# Patient Record
Sex: MALE | Race: WHITE | NOT HISPANIC OR LATINO | URBAN - METROPOLITAN AREA
[De-identification: names, ages, dates, MRNs, and addresses within clinical notes are randomized per-mention and may not be internally consistent; named-entity substitution may affect disease eponyms.]

---

## 2024-10-21 ENCOUNTER — HOSPITAL ENCOUNTER (EMERGENCY)
Facility: HOSPITAL | Age: 29
Discharge: HOME | End: 2024-10-21
Attending: EMERGENCY MEDICINE

## 2024-10-21 VITALS
HEIGHT: 76 IN | TEMPERATURE: 98 F | DIASTOLIC BLOOD PRESSURE: 94 MMHG | SYSTOLIC BLOOD PRESSURE: 170 MMHG | OXYGEN SATURATION: 97 % | RESPIRATION RATE: 16 BRPM | BODY MASS INDEX: 38.36 KG/M2 | HEART RATE: 97 BPM | WEIGHT: 315 LBS

## 2024-10-21 DIAGNOSIS — M54.16 LUMBAR RADICULOPATHY: Primary | ICD-10-CM

## 2024-10-21 PROCEDURE — 99284 EMERGENCY DEPT VISIT MOD MDM: CPT | Mod: 25,ER

## 2024-10-21 PROCEDURE — 25000003 PHARM REV CODE 250: Mod: ER | Performed by: EMERGENCY MEDICINE

## 2024-10-21 RX ORDER — HYDROCODONE BITARTRATE AND ACETAMINOPHEN 5; 325 MG/1; MG/1
1 TABLET ORAL
Status: COMPLETED | OUTPATIENT
Start: 2024-10-21 | End: 2024-10-21

## 2024-10-21 RX ORDER — METHYLPREDNISOLONE 4 MG/1
TABLET ORAL
Qty: 1 EACH | Refills: 0 | Status: SHIPPED | OUTPATIENT
Start: 2024-10-21

## 2024-10-21 RX ORDER — METHOCARBAMOL 500 MG/1
1000 TABLET, FILM COATED ORAL 3 TIMES DAILY
Qty: 30 TABLET | Refills: 0 | Status: SHIPPED | OUTPATIENT
Start: 2024-10-21 | End: 2024-10-26

## 2024-10-21 RX ADMIN — HYDROCODONE BITARTRATE AND ACETAMINOPHEN 1 TABLET: 5; 325 TABLET ORAL at 03:10

## 2024-10-24 ENCOUNTER — HOSPITAL ENCOUNTER (EMERGENCY)
Facility: HOSPITAL | Age: 29
Discharge: HOME | End: 2024-10-25
Attending: EMERGENCY MEDICINE

## 2024-10-24 DIAGNOSIS — M51.26 LUMBAR HERNIATED DISC: Primary | ICD-10-CM

## 2024-10-24 LAB
ALBUMIN SERPL BCP-MCNC: 4.3 G/DL (ref 3.5–5.2)
ALP SERPL-CCNC: 74 U/L (ref 40–150)
ALT SERPL W/O P-5'-P-CCNC: 42 U/L (ref 10–44)
ANION GAP SERPL CALC-SCNC: 9 MMOL/L (ref 8–16)
AST SERPL-CCNC: 28 U/L (ref 10–40)
BASOPHILS # BLD AUTO: 0.06 K/UL (ref 0–0.2)
BASOPHILS NFR BLD: 0.3 % (ref 0–1.9)
BILIRUB SERPL-MCNC: 0.4 MG/DL (ref 0.1–1)
BUN SERPL-MCNC: 16 MG/DL (ref 6–20)
CALCIUM SERPL-MCNC: 9.4 MG/DL (ref 8.7–10.5)
CHLORIDE SERPL-SCNC: 105 MMOL/L (ref 95–110)
CO2 SERPL-SCNC: 24 MMOL/L (ref 23–29)
CREAT SERPL-MCNC: 0.8 MG/DL (ref 0.5–1.4)
DIFFERENTIAL METHOD BLD: ABNORMAL
EOSINOPHIL # BLD AUTO: 0.2 K/UL (ref 0–0.5)
EOSINOPHIL NFR BLD: 1 % (ref 0–8)
ERYTHROCYTE [DISTWIDTH] IN BLOOD BY AUTOMATED COUNT: 12.6 % (ref 11.5–14.5)
EST. GFR  (NO RACE VARIABLE): >60 ML/MIN/1.73 M^2
GLUCOSE SERPL-MCNC: 102 MG/DL (ref 70–110)
HCT VFR BLD AUTO: 49.9 % (ref 40–54)
HGB BLD-MCNC: 16.7 G/DL (ref 14–18)
IMM GRANULOCYTES # BLD AUTO: 0.14 K/UL (ref 0–0.04)
IMM GRANULOCYTES NFR BLD AUTO: 0.8 % (ref 0–0.5)
LYMPHOCYTES # BLD AUTO: 2.6 K/UL (ref 1–4.8)
LYMPHOCYTES NFR BLD: 14.5 % (ref 18–48)
MCH RBC QN AUTO: 30 PG (ref 27–31)
MCHC RBC AUTO-ENTMCNC: 33.5 G/DL (ref 32–36)
MCV RBC AUTO: 90 FL (ref 82–98)
MONOCYTES # BLD AUTO: 1 K/UL (ref 0.3–1)
MONOCYTES NFR BLD: 5.6 % (ref 4–15)
NEUTROPHILS # BLD AUTO: 14 K/UL (ref 1.8–7.7)
NEUTROPHILS NFR BLD: 77.8 % (ref 38–73)
NRBC BLD-RTO: 0 /100 WBC
PLATELET # BLD AUTO: 264 K/UL (ref 150–450)
PMV BLD AUTO: 11 FL (ref 9.2–12.9)
POTASSIUM SERPL-SCNC: 4.3 MMOL/L (ref 3.5–5.1)
PROT SERPL-MCNC: 7.3 G/DL (ref 6–8.4)
RBC # BLD AUTO: 5.56 M/UL (ref 4.6–6.2)
SODIUM SERPL-SCNC: 138 MMOL/L (ref 136–145)
WBC # BLD AUTO: 17.96 K/UL (ref 3.9–12.7)

## 2024-10-24 PROCEDURE — 85025 COMPLETE CBC W/AUTO DIFF WBC: CPT

## 2024-10-24 PROCEDURE — A9585 GADOBUTROL INJECTION: HCPCS | Performed by: EMERGENCY MEDICINE

## 2024-10-24 PROCEDURE — 99284 EMERGENCY DEPT VISIT MOD MDM: CPT | Mod: 25

## 2024-10-24 PROCEDURE — 25500020 PHARM REV CODE 255: Performed by: EMERGENCY MEDICINE

## 2024-10-24 PROCEDURE — 80053 COMPREHEN METABOLIC PANEL: CPT

## 2024-10-24 RX ORDER — GADOBUTROL 604.72 MG/ML
10 INJECTION INTRAVENOUS
Status: COMPLETED | OUTPATIENT
Start: 2024-10-24 | End: 2024-10-24

## 2024-10-24 RX ADMIN — GADOBUTROL 10 ML: 604.72 INJECTION INTRAVENOUS at 05:10

## 2024-10-25 VITALS
HEIGHT: 76 IN | WEIGHT: 315 LBS | SYSTOLIC BLOOD PRESSURE: 156 MMHG | HEART RATE: 63 BPM | DIASTOLIC BLOOD PRESSURE: 73 MMHG | RESPIRATION RATE: 17 BRPM | TEMPERATURE: 98 F | OXYGEN SATURATION: 97 % | BODY MASS INDEX: 38.36 KG/M2

## 2024-10-25 PROBLEM — M51.26 LUMBAR DISC HERNIATION: Status: ACTIVE | Noted: 2024-10-25

## 2024-10-25 PROCEDURE — 99284 EMERGENCY DEPT VISIT MOD MDM: CPT | Mod: ,,, | Performed by: NEUROLOGICAL SURGERY

## 2024-11-04 ENCOUNTER — CLINICAL SUPPORT (OUTPATIENT)
Dept: REHABILITATION | Facility: HOSPITAL | Age: 29
End: 2024-11-04
Attending: EMERGENCY MEDICINE

## 2024-11-04 DIAGNOSIS — M51.26 LUMBAR HERNIATED DISC: ICD-10-CM

## 2024-11-04 PROCEDURE — 97530 THERAPEUTIC ACTIVITIES: CPT | Mod: PO

## 2024-11-04 PROCEDURE — 97110 THERAPEUTIC EXERCISES: CPT | Mod: PO

## 2024-11-04 PROCEDURE — 97140 MANUAL THERAPY 1/> REGIONS: CPT | Mod: PO

## 2024-11-04 PROCEDURE — 97162 PT EVAL MOD COMPLEX 30 MIN: CPT | Mod: PO

## 2024-11-04 PROCEDURE — 97112 NEUROMUSCULAR REEDUCATION: CPT | Mod: PO

## 2024-11-04 NOTE — PLAN OF CARE
"[facility]   Physical Therapy Initial Evaluation      Name: Pankaj Bustamante  Clinic Number: [id number]    Therapy Diagnosis:   Encounter Diagnosis   Name Primary?    Lumbar herniated disc         Physician: Lita Nina MD    Physician Orders: PT Eval and Treat ***  Medical Diagnosis from Referral: Lumbar herniated disc  Evaluation Date: 11/4/2024  Authorization Period Expiration: ***  Plan of Care Expiration: ***  Progress Note Due: ***  Date of Surgery: ***  Visit # / Visits authorized: ***/ ***   FOTO: ***/ 3    Precautions: {IP WOUND PRECAUTIONS OHS:22932}     Time In: ***  Time Out: ***  Total Billable Time: *** minutes    Subjective     Date of onset: ***    History of current condition - Denominational reports: ***    Falls: ***    Imaging: {Mri/ctscan/bone scan:44313}: ***    Prior Therapy: ***  Social History: *** {LIVES WITH:44613}  Occupation: ***  Prior Level of Function: ***  Current Level of Function: ***    Pain:  Current {0-10:20507::"0"}/10, worst {0-10:20507::"0"}/10, best {0-10:20507::"0"}/10   Location: {RIGHT LEFT BILATERAL:48969} {LOCATION ON BODY:72718} {Pain Loc:67241}  Description: {Pain Description:02076}  Aggravating Factors: {Causes; Pain:95818}  Easing Factors: {Pain (activities that relieve):35209}    Patients goals: ***     Medical History:   No past medical history on file.    Surgical History:   Pankaj Bustamante  has no past surgical history on file.    Medications:   Denominational has a current medication list which includes the following prescription(s): methylprednisolone.    Allergies:   Review of patient's allergies indicates:  No Known Allergies     Objective      ***    Intake Outcome Measure for FOTO *** Survey    Therapist reviewed FOTO scores for Pankaj Bustamante on 11/4/2024.   FOTO report - see Media section or FOTO account episode details.    Intake Score: ***%         Treatment     Total Treatment time (time-based codes) separate " from Evaluation: *** minutes     Pankaj received the treatments listed below:      therapeutic exercises to develop {AMB PT PROGRESS OBJECTIVE:00256} for *** minutes including:  ***    manual therapy techniques: {AMB PT PROGRESS MANUAL THERAPY:89765} were applied to the: *** for *** minutes, including:  ***    neuromuscular re-education activities to improve: {AMB PT PROGRESS NEURO RE-ED:07940} for *** minutes. The following activities were included:  ***    therapeutic activities to improve functional performance for ***  minutes, including:  ***    gait training to improve functional mobility and safety for ***  minutes, including:  ***    direct contact modalities after being cleared for contraindications: {AMB PT PROGRESS DIRECT CONTACT MODES:67505}    supervised modalities after being cleared for contradictions: {AMB PT SUPERVISED MODES:25906}    hot pack for *** minutes to ***.    cold pack for *** minutes to ***.    Patient Education and Home Exercises     Education provided:   - ***    Written Home Exercises Provided: {Home Exercise Program:01288}. Exercises were reviewed and Pankaj was able to demonstrate them prior to the end of the session.  Pankaj demonstrated {Desc; good/fair/poor:69540} understanding of the education provided. See EMR under Patient Instructions for exercises provided during therapy sessions.    Assessment     Pankaj is a 29 y.o. male referred to outpatient Physical Therapy with a medical diagnosis of ***. Patient presents with ***    Patient prognosis is {REHAB PROGNOSIS OHS:08452}.   Patient will benefit from skilled outpatient Physical Therapy to address the deficits stated above and in the chart below, provide patient /family education, and to maximize patientt's level of independence.     Plan of care discussed with patient: {YES:16220}  Patient's spiritual, cultural and educational needs considered and patient is agreeable to the plan of care and goals as stated below:  "    Anticipated Barriers for therapy: ***    Medical Necessity is demonstrated by the following  History  Co-morbidities and personal factors that may impact the plan of care [] LOW: no personal factors / co-morbidities  [] MODERATE: 1-2 personal factors / co-morbidities  [] HIGH: 3+ personal factors / co-morbidities    Moderate / High Support Documentation:   Co-morbidities affecting plan of care: ***    Personal Factors:   {Personal Factors:18137}     Examination  Body Structures and Functions, activity limitations and participation restrictions that may impact the plan of care [] LOW: addressing 1-2 elements  [] MODERATE: 3+ elements  [] HIGH: 4+ elements (please support below)    Moderate / High Support Documentation: ***     Clinical Presentation [] LOW: stable  [] MODERATE: Evolving  [] HIGH: Unstable     Decision Making/ Complexity Score: {Desc; low/moderate/high:146377}       Goals:  Short Term Goals: *** weeks   ***    Long Term Goals: *** weeks   ***  Plan     Plan of care Certification: 11/4/2024 to ***.    Outpatient Physical Therapy {NUMBERS 1-5:53603} times weekly for {0-10:40866::"0"} weeks to include the following interventions: {TX PLAN:36987}.     Yanna Morales PT        Physician's Signature: _________________________________________ Date: ________________  "

## 2024-11-04 NOTE — PROGRESS NOTES
"[facility]   Physical Therapy Initial Evaluation      Name: Pankaj Bustamante  [facility] Number: [id number]    Therapy Diagnosis:   Encounter Diagnosis   Name Primary?    Lumbar herniated disc         Physician: Lita Nina MD    Physician Orders: PT Eval and Treat  Medical Diagnosis from Referral: Degenerative Lumbar Disc Disease with Severe Spinal Canal Stenosis at L3-L4, Broad-based disc bulge at L4-L5 and L5-S1  Evaluation Date: 11/4/2024  Authorization Period Expiration: 11/4/2025  Plan of Care Expiration: 12/31/2024  Progress Note Due: 11/30/2024  Date of Surgery: NAD  Visit # / Visits authorized: 1/ 1   FOTO: 1/ 3    Precautions:  NAD      Time In: 2:00 pm  Time Out: 3:00 pm  Total Billable Time: 60 minutes    Subjective     Date of onset: Presented to emergency department with complaint of two weeks of low back pain, and 4 days of numbness to bilateral lower extremities.  Also with left-sided leg weakness.  He originally had some right-sided leg weakness but feels that it is resolving.  He was normal rectal tone, normal postvoid residual, no urinary or stool incontinence.      History of current condition - Back Pain (Pt pulled muscle in lower right back 2 weeks ago. Pain recurrent. Pt reporting numbness in lower limbs; worse on right side. Currently on steroids and anti inflammatory meds.) Patient seen by Neurosurgery, recommending discharge home with follow up in their clinic, and referral to physical therapy that was placed.     Falls: Yes. One. 4/7 ago. Legs gave out. "Could not feel my feet"    Imaging: MRI studies: EXAMINATION:  MRI LUMBAR SPINE W WO CONTRAST     CLINICAL HISTORY:  Low back pain, cauda equina syndrome suspected;     TECHNIQUE:  Multiplanar, multisequence MR images were acquired from the thoracolumbar junction to the sacrum without and with 10 mL Gadavist intravenous contrast.     COMPARISON:  CT L-spine 10/21/2024.     FINDINGS:  Alignment: Normal " sagittal alignment.     Vertebrae: No fracture or marrow infiltrative process.     Discs: Degenerative disc desiccation and mild height loss at L3-L4 through L5-S1.     Cord: Conus terminates at L2 and appears unremarkable.  Cauda equina appears unremarkable.     Degenerative findings:     T12-L1: No spinal canal stenosis or neural foraminal narrowing.     L1-L2: No spinal canal stenosis or neural foraminal narrowing.     L2-L3: No spinal canal stenosis or neural foraminal narrowing.     L3-L4: Central disc protrusion and mild facet arthropathy contributing to severe spinal canal stenosis.  No significant neural foraminal narrowing.     L4-L5: Broad-based posterior disc bulge and mild facet arthropathy contributing to mild spinal canal stenosis with narrowing at the bilateral lateral recess.     L5-S1: Broad-based posterior disc bulge and mild facet arthropathy contributing to mild right neural foraminal narrowing.     Paraspinal muscles & soft tissues: Unremarkable.     There is no evidence of abnormal enhancement following intravenous contrast administration.     Impression:     Degenerative change of the lumbar spine, as above.  Findings most pronounced at L3-L4 with central disc protrusion contributing to severe spinal canal stenosis.    Prior Therapy: None  Social History: Lives with his [facility]  Occupation: Works in construction  Prior Level of Function: Independent  Current Level of Function: Severely Limited    Pain:  Current 7/10, worst 8/10, best 7/10   Location: bilateral back  back - lumbar  Description: Aching, Tingling, Deep, Numb, and Electric  Aggravating Factors: Sitting, Standing, Laying, Bending, Touching, Coughing/Sneezing, Eating, Walking, Night Time, Morning, Extension, Flexing, Lifting, and Getting out of bed/chair  Easing Factors: pain medication    Patients goals: Learn and apply pain relief techniques.  Reduce daily pain levels by a set amount.  Increase walking tolerance to a specific  time or distance.  Improve ability to complete daily activities with less discomfort.  Complete a daily stretching routine to improve flexibility.  Achieve a specific range of motion goal.  Perform strengthening exercises three times a week.     Medical History:   No past medical history on file.    Surgical History:   Pankaj Bustamante  has no past surgical history on file.    Medications:   Pankaj has a current medication list which includes the following prescription(s): methylprednisolone.    Allergies:   Review of patient's allergies indicates:  No Known Allergies     Objective      Range of Motion (ROM):  Lumbar flexion: Limited with discomfort  Lumbar extension: Limited with significant pain  Lateral bending: Mild to moderate limitation bilaterally    Muscle Group Movement Strength RT  (0-5) Strength LT (0-5) Nerve Root Observations   Hip Flexors Hip Flexion  4+/5 L2-L3 Mild weakness   Quadriceps Knee Extension  5/5 L3-L4 Full strength   Hamstrings Knee Flexion  5/5 L5-S1 Normal   Tibialis Anterior Ankle Dorsiflexion  4+/5 L4-L5 Slight weakness   Gastrocnemius/Soleus Plantar Flexion  5/5 S1-S2 Normal   Gluteus Earl Hip Extension  4/5 S1 Mild to moderate weakness   Gluteus Medius/Minimus Hip Abduction  4+/5 L4-S1 Mild weakness   Peroneals Foot Eversion  4+/5 L5-S1 Mild weakness   EHL (Extensor Hallucis Longus) Big Toe Extension  2+/5 L5 Moderate weakness   Adductors Hip Adduction  3/5 L2-L4 Full strength     Tests & Measures:  SLR (Straight Leg Raise): Positive for radicular symptoms at 30-45°  Slump Test: Positive for increased symptoms in lumbar region and right lower extremity  Facet Joint Loading: Positive bilaterally, especially at L3-L4    Intake Outcome Measure for FOTO Lumbar Survey    Therapist reviewed FOTO scores for Pankaj Bustamante on 11/4/2024.   FOTO report - see Media section or FOTO account episode details.    Intake Score: 53%         Treatment     Total Treatment time  (time-based codes) separate from Evaluation: 60 minutes     Pankaj received the treatments listed below:      Therapeutic Exercises (15')  Pelvic Tilts: 10 reps  2 sets  Bridging Exercises: 10 reps  3 sets  Partial Wall Squats: 10 reps  2 sets  Cat-Camel Exercise (for gentle lumbar mobility): 10 reps  2 sets  Supine Hamstring Stretch: 20-second hold  3 sets each leg  Single Knee to Chest Stretch: 20-second hold  3 sets each side  Manual Therapy (30')  Gentle joint mobilizations focusing on lumbar facet joints (L3-L4, L4-L5) to reduce stiffness and alleviate localized pain  Soft tissue mobilization to lumbar paraspinals and quadratus lumborum to relieve muscle tension  Tamika Protocol - 1/4 Prone Extension x 4/10 Reps, Stabilization of Sacrum, IT Band and Iliopsoas Stretch Bilateral with SI Joint Nutation Bias.   Neuromuscular Re-Education (15')  Core Stabilization (e.g., Supine Abdominal Bracing): 10-second hold  5 reps  Lumbopelvic Control Drills: 10 reps  3 sets  Therapeutic Activities (15')  Sit-to-Stand Transfers: 10 reps  2 sets to encourage functional strength and mobility  Step-Ups (4-inch height): 10 reps  2 sets each leg to build lower extremity strength    Patient Education and Home Exercises     Education provided:   Importance of posture, neutral spine during activities, and lumbar support techniques  Instruction on body mechanics, especially avoiding forward bending and heavy lifting  Education on self-management techniques for acute pain episodes, including ice/heat and activity modification    Written Home Exercises Provided: Yes. Exercises were reviewed and Pankaj was able to demonstrate them prior to the end of the session.  Pankaj demonstrated good  understanding of the education provided. See EMR under Patient Instructions for exercises provided during therapy sessions.    Assessment     Pankaj is a 29 y.o. male referred to outpatient Physical Therapy with a medical  diagnosis of Degenerative Lumbar Disc Disease with Severe Spinal Canal Stenosis at L3-L4, Broad-based disc bulge at L4-L5 and L5-S1.     Patient exhibits severe spinal canal stenosis at L3-L4 with symptoms consistent with lumbar radiculopathy, including positive straight leg raise and slump tests. Degenerative changes and facet arthropathy at L4-L5 and L5-S1 contribute to further lumbar mobility restrictions and pain, impacting functional activities. Neurological findings indicate mild motor weakness and sensory changes without severe neurological compromise, suggesting conservative management may be beneficial.    Patient prognosis is Good.   Patient will benefit from skilled outpatient Physical Therapy to address the deficits stated above and in the chart below, provide patient /family education, and to maximize patientt's level of independence.     Plan of care discussed with patient: Yes  Patient's spiritual, cultural and educational needs considered and patient is agreeable to the plan of care and goals as stated below:     Anticipated Barriers for therapy: Obesity    Medical Necessity is demonstrated by the following  History  Co-morbidities and personal factors that may impact the plan of care [] LOW: no personal factors / co-morbidities  [x] MODERATE: 1-2 personal factors / co-morbidities  [] HIGH: 3+ personal factors / co-morbidities    Moderate / High Support Documentation:   Co-morbidities affecting plan of care: Chronic Obesity, See medical chart    Personal Factors:   Morbid Obesity, Poor postural mechanics, DOL: Work Classification - Extra heavy work demand     Examination  Body Structures and Functions, activity limitations and participation restrictions that may impact the plan of care [] LOW: addressing 1-2 elements  [x] MODERATE: 3+ elements  [] HIGH: 4+ elements (please support below)    Moderate / High Support Documentation: Acute HNP with Motor and sensory deficits     Clinical Presentation []  LOW: stable  [x] MODERATE: Evolving  [] HIGH: Unstable     Decision Making/ Complexity Score: moderate       Goals:  Short Term Goals: 3 weeks   Reduce pain by 25% during basic activities such as sitting, standing, and walking  Improve lumbar flexibility by 5-10° in flexion and extension  Achieve independent performance of a daily stretching and strengthening home exercise program  Reduce radicular symptoms during SLR by at least 10°    Long Term Goals: 8 weeks   Reduce overall pain by 50% during functional activities (e.g., prolonged sitting, walking)  Increase lumbar flexion and extension ROM by at least 20%  Improve core stability, allowing the patient to perform 20-second holds during stabilization exercises  Improve endurance for daily activities, enabling 20 minutes of continuous walking without significant pain  Return to moderate physical activity with appropriate lumbar protection techniques  Plan     Plan of care Certification: 11/4/2024 to 12/31/2024.    Plan of Care (3 Months Duration)  Frequency: Begin with 3 sessions per week, reducing frequency as the patient gains independence with exercises and pain management.  Progression:  Initial focus on pain reduction, gentle mobility exercises, and core stabilization.  Progress to more dynamic strengthening and functional exercises as tolerated.  Gradual increase in activity levels with a focus on improving flexibility and core endurance.  Reevaluation: Assess progress at 4-week intervals to modify treatment and adjust goals based on patients response.  Home Exercise Program: Reinforce independent home exercises for ongoing management, with a focus on long-term flexibility and core stability to prevent exacerbations.    Yanna Morales, PT  11/4/2024      Physician's Signature: _________________________________________ Date: ________________

## 2024-11-06 ENCOUNTER — CLINICAL SUPPORT (OUTPATIENT)
Dept: REHABILITATION | Facility: HOSPITAL | Age: 29
End: 2024-11-06

## 2024-11-06 DIAGNOSIS — R53.1 DECREASED STRENGTH: ICD-10-CM

## 2024-11-06 DIAGNOSIS — M51.26 LUMBAR DISC HERNIATION: Primary | ICD-10-CM

## 2024-11-06 DIAGNOSIS — R26.89 DECREASED MOBILITY: ICD-10-CM

## 2024-11-06 PROCEDURE — 97110 THERAPEUTIC EXERCISES: CPT | Mod: PO

## 2024-11-06 PROCEDURE — 97140 MANUAL THERAPY 1/> REGIONS: CPT | Mod: PO

## 2024-11-06 NOTE — PLAN OF CARE
"[facility]   Physical Therapy Initial Evaluation      Name: [facility] Number: [id number]    Therapy Diagnosis:   Encounter Diagnosis   Name Primary?    Lumbar herniated disc         Physician: Lita Nina MD    Name: [facility] Number: [id number]    Physician Orders: PT Eval and Treat  Medical Diagnosis from Referral: Degenerative Lumbar Disc Disease with Severe Spinal Canal Stenosis at L3-L4, Broad-based disc bulge at L4-L5 and L5-S1  Evaluation Date: 11/4/2024  Authorization Period Expiration: 11/4/2025  Plan of Care Expiration: 12/31/2024  Progress Note Due: 11/30/2024  Date of Surgery: NAD  Visit # / Visits authorized: 1/ 1   FOTO: 1/ 3    Precautions: NAD     Time In: 2:00 pm  Time Out: 3:00 pm  Total Billable Time: 60 minutes    Subjective     Date of onset: Presented to emergency department with complaint of two weeks of low back pain, and 4 days of numbness to bilateral lower extremities.  Also with left-sided leg weakness.  He originally had some right-sided leg weakness but feels that it is resolving.  He was normal rectal tone, normal postvoid residual, no urinary or stool incontinence.      History of current condition - Back Pain (Pt pulled muscle in lower right back 2 weeks ago. Pain recurrent. Pt reporting numbness in lower limbs; worse on right side. Currently on steroids and anti inflammatory meds.) Patient seen by Neurosurgery, recommending discharge home with follow up in their clinic, and referral to physical therapy that was placed.     Falls: Yes. One. 4/7 ago. Legs gave out. "Could not feel my feet"    Imaging: MRI studies: EXAMINATION:  MRI LUMBAR SPINE W WO CONTRAST     CLINICAL HISTORY:  Low back pain, cauda equina syndrome suspected;     TECHNIQUE:  Multiplanar, multisequence MR images were acquired from the thoracolumbar junction to the sacrum without and with 10 mL Gadavist intravenous contrast.     COMPARISON:  CT " L-spine 10/21/2024.     FINDINGS:  Alignment: Normal sagittal alignment.     Vertebrae: No fracture or marrow infiltrative process.     Discs: Degenerative disc desiccation and mild height loss at L3-L4 through L5-S1.     Cord: Conus terminates at L2 and appears unremarkable.  Cauda equina appears unremarkable.     Degenerative findings:     T12-L1: No spinal canal stenosis or neural foraminal narrowing.     L1-L2: No spinal canal stenosis or neural foraminal narrowing.     L2-L3: No spinal canal stenosis or neural foraminal narrowing.     L3-L4: Central disc protrusion and mild facet arthropathy contributing to severe spinal canal stenosis.  No significant neural foraminal narrowing.     L4-L5: Broad-based posterior disc bulge and mild facet arthropathy contributing to mild spinal canal stenosis with narrowing at the bilateral lateral recess.     L5-S1: Broad-based posterior disc bulge and mild facet arthropathy contributing to mild right neural foraminal narrowing.     Paraspinal muscles & soft tissues: Unremarkable.     There is no evidence of abnormal enhancement following intravenous contrast administration.     Impression:     Degenerative change of the lumbar spine, as above.  Findings most pronounced at L3-L4 with central disc protrusion contributing to severe spinal canal stenosis.    Prior Therapy: None  Social History: Lives with his [facility]  Occupation: Works in construction  Prior Level of Function: Independent  Current Level of Function: Severely Limited    Pain:  Current 7/10, worst 8/10, best 7/10   Location: bilateral back  back - lumbar  Description: Aching, Tingling, Deep, Numb, and Electric  Aggravating Factors: Sitting, Standing, Laying, Bending, Touching, Coughing/Sneezing, Eating, Walking, Night Time, Morning, Extension, Flexing, Lifting, and Getting out of bed/chair  Easing Factors: pain medication    Patients goals: Learn and apply pain relief techniques.  Reduce daily pain levels by a set  amount.  Increase walking tolerance to a specific time or distance.  Improve ability to complete daily activities with less discomfort.  Complete a daily stretching routine to improve flexibility.  Achieve a specific range of motion goal.  Perform strengthening exercises three times a week.     Medical History:   No past medical history on file.    Surgical History:   Pankaj Bustamante  has no past surgical history on file.    Medications:   Pankaj has a current medication list which includes the following prescription(s): methylprednisolone.    Allergies:   Review of patient's allergies indicates:  No Known Allergies     Objective      Range of Motion (ROM):  Lumbar flexion: Limited with discomfort  Lumbar extension: Limited with significant pain  Lateral bending: Mild to moderate limitation bilaterally    Muscle Group Movement Strength RT  (0-5) Strength LT (0-5) Nerve Root Observations   Hip Flexors Hip Flexion  4+/5 L2-L3 Mild weakness   Quadriceps Knee Extension  5/5 L3-L4 Full strength   Hamstrings Knee Flexion  5/5 L5-S1 Normal   Tibialis Anterior Ankle Dorsiflexion  4+/5 L4-L5 Slight weakness   Gastrocnemius/Soleus Plantar Flexion  5/5 S1-S2 Normal   Gluteus Earl Hip Extension  4/5 S1 Mild to moderate weakness   Gluteus Medius/Minimus Hip Abduction  4+/5 L4-S1 Mild weakness   Peroneals Foot Eversion  4+/5 L5-S1 Mild weakness   EHL (Extensor Hallucis Longus) Big Toe Extension  2+/5 L5 Moderate weakness   Adductors Hip Adduction  3/5 L2-L4 Full strength     Tests & Measures:  SLR (Straight Leg Raise): Positive for radicular symptoms at 30-45°  Slump Test: Positive for increased symptoms in lumbar region and right lower extremity  Facet Joint Loading: Positive bilaterally, especially at L3-L4    Intake Outcome Measure for FOTO Lumbar Survey    Therapist reviewed FOTO scores for Pankaj Bustamante on 11/4/2024.   FOTO report - see Media section or FOTO account episode details.    Intake Score: 53%          Treatment     Total Treatment time (time-based codes) separate from Evaluation: 60 minutes     Pankaj received the treatments listed below:      Therapeutic Exercises (15')  Pelvic Tilts: 10 reps  2 sets  Bridging Exercises: 10 reps  3 sets  Partial Wall Squats: 10 reps  2 sets  Cat-Camel Exercise (for gentle lumbar mobility): 10 reps  2 sets  Supine Hamstring Stretch: 20-second hold  3 sets each leg  Single Knee to Chest Stretch: 20-second hold  3 sets each side  Manual Therapy (30')  Gentle joint mobilizations focusing on lumbar facet joints (L3-L4, L4-L5) to reduce stiffness and alleviate localized pain  Soft tissue mobilization to lumbar paraspinals and quadratus lumborum to relieve muscle tension  Tamika Protocol - 1/4 Prone Extension x 4/10 Reps, Stabilization of Sacrum, IT Band and Iliopsoas Stretch Bilateral with SI Joint Nutation Bias.   Neuromuscular Re-Education (15')  Core Stabilization (e.g., Supine Abdominal Bracing): 10-second hold  5 reps  Lumbopelvic Control Drills: 10 reps  3 sets  Therapeutic Activities (15')  Sit-to-Stand Transfers: 10 reps  2 sets to encourage functional strength and mobility  Step-Ups (4-inch height): 10 reps  2 sets each leg to build lower extremity strength    Patient Education and Home Exercises     Education provided:   Importance of posture, neutral spine during activities, and lumbar support techniques  Instruction on body mechanics, especially avoiding forward bending and heavy lifting  Education on self-management techniques for acute pain episodes, including ice/heat and activity modification    Written Home Exercises Provided: Yes. Exercises were reviewed and Pankaj was able to demonstrate them prior to the end of the session.  Pankaj demonstrated good  understanding of the education provided. See EMR under Patient Instructions for exercises provided during therapy sessions.    Assessment     Pankaj is a 29 y.o. male referred to  outpatient Physical Therapy with a medical diagnosis of Degenerative Lumbar Disc Disease with Severe Spinal Canal Stenosis at L3-L4, Broad-based disc bulge at L4-L5 and L5-S1.     Patient exhibits severe spinal canal stenosis at L3-L4 with symptoms consistent with lumbar radiculopathy, including positive straight leg raise and slump tests. Degenerative changes and facet arthropathy at L4-L5 and L5-S1 contribute to further lumbar mobility restrictions and pain, impacting functional activities. Neurological findings indicate mild motor weakness and sensory changes without severe neurological compromise, suggesting conservative management may be beneficial.    Patient prognosis is Good.   Patient will benefit from skilled outpatient Physical Therapy to address the deficits stated above and in the chart below, provide patient /family education, and to maximize patientt's level of independence.     Plan of care discussed with patient: Yes  Patient's spiritual, cultural and educational needs considered and patient is agreeable to the plan of care and goals as stated below:     Anticipated Barriers for therapy: Obesity    Medical Necessity is demonstrated by the following  History  Co-morbidities and personal factors that may impact the plan of care [] LOW: no personal factors / co-morbidities  [x] MODERATE: 1-2 personal factors / co-morbidities  [] HIGH: 3+ personal factors / co-morbidities    Moderate / High Support Documentation:   Co-morbidities affecting plan of care: Chronic Obesity, See medical chart    Personal Factors:   Morbid Obesity, Poor postural mechanics, DOL: Work Classification - Extra heavy work demand     Examination  Body Structures and Functions, activity limitations and participation restrictions that may impact the plan of care [] LOW: addressing 1-2 elements  [x] MODERATE: 3+ elements  [] HIGH: 4+ elements (please support below)    Moderate / High Support Documentation: Acute HNP with Motor and  sensory deficits     Clinical Presentation [] LOW: stable  [x] MODERATE: Evolving  [] HIGH: Unstable     Decision Making/ Complexity Score: moderate       Goals:  Short Term Goals: 3 weeks   Reduce pain by 25% during basic activities such as sitting, standing, and walking  Improve lumbar flexibility by 5-10° in flexion and extension  Achieve independent performance of a daily stretching and strengthening home exercise program  Reduce radicular symptoms during SLR by at least 10°    Long Term Goals: 8 weeks   Reduce overall pain by 50% during functional activities (e.g., prolonged sitting, walking)  Increase lumbar flexion and extension ROM by at least 20%  Improve core stability, allowing the patient to perform 20-second holds during stabilization exercises  Improve endurance for daily activities, enabling 20 minutes of continuous walking without significant pain  Return to moderate physical activity with appropriate lumbar protection techniques  Plan     Plan of care Certification: 11/4/2024 to 12/31/2024.    Plan of Care (3 Months Duration)  Frequency: Begin with 3 sessions per week, reducing frequency as the patient gains independence with exercises and pain management.  Progression:  Initial focus on pain reduction, gentle mobility exercises, and core stabilization.  Progress to more dynamic strengthening and functional exercises as tolerated.  Gradual increase in activity levels with a focus on improving flexibility and core endurance.  Reevaluation: Assess progress at 4-week intervals to modify treatment and adjust goals based on patients response.  Home Exercise Program: Reinforce independent home exercises for ongoing management, with a focus on long-term flexibility and core stability to prevent exacerbations.    Yanna Morales, PT  11/4/2024      Physician's Signature: _________________________________________ Date: ________________

## 2024-11-07 NOTE — PROGRESS NOTES
DATE: 11/7/2024  PATIENT: Pankaj Bustamante    Supervising Physician: Mike Paul M.D.    CHIEF COMPLAINT: low back and bilateral leg pain    HISTORY:  Pankaj Bustamante is a 29 y.o. male here for initial evaluation of low back and bilateral (L>R) leg pain (Back - 3, Leg - 3).  The pain in the left leg is what bothers him most.  The pain has been present for weeks and started after bending/twisting. The patient describes the pain as shooting down the sides of both legs.  The pain is worse with walking and improved by nothing. There is positive associated numbness and tingling. There is positive subjective weakness. He denies hx of back problems in the past. He has been seen in the ED multiple times recently for similar complaints and is currently in PT.    The patient denies myelopathic symptoms such as handwriting changes or difficulty with buttons/coins/keys. ENDORSES perineal paresthesias, denies bowel/bladder dysfunction.    PAST MEDICAL/SURGICAL HISTORY:  No past medical history on file.  No past surgical history on file.    Medications:   Current Outpatient Medications on File Prior to Visit   Medication Sig Dispense Refill    methylPREDNISolone (MEDROL DOSEPACK) 4 mg tablet Take as directed 1 each 0     No current facility-administered medications on file prior to visit.       Social History:   Social History     Socioeconomic History    Marital status: Single   Tobacco Use    Smoking status: Every Day     Types: Cigarettes    Smokeless tobacco: Never   Substance and Sexual Activity    Alcohol use: Yes     Comment: occ    Drug use: Never       REVIEW OF SYSTEMS:  Constitution: Negative. Negative for chills, fever and night sweats.   Cardiovascular: Negative for chest pain and syncope.   Respiratory: Negative for cough and shortness of breath.   Gastrointestinal: See HPI. Negative for nausea/vomiting. Negative for abdominal pain.  Genitourinary: See HPI. Negative for discoloration or dysuria.  Skin:  "Negative for dry skin, itching and rash.   Hematologic/Lymphatic: Negative for bleeding problem. Does not bruise/bleed easily.   Musculoskeletal: Negative for falls and muscle weakness.   Neurological: See HPI. No seizures.   Endocrine: Negative for polydipsia, polyphagia and polyuria.   Allergic/Immunologic: Negative for hives and persistent infections.     EXAM:  There were no vitals taken for this visit.    General: The patient is a very pleasant 29 y.o. male in no apparent distress, the patient is oriented to person, place and time.  Psych: Normal mood and affect  HEENT: Vision grossly intact, hearing intact to the spoken word.  Lungs: Respirations unlabored.  Gait: Severely antalgic station gait, unable to walk without walker   Skin: Dorsal lumbar skin negative for rashes, lesions, hairy patches and surgical scars. There is negative lumbar tenderness to palpation.  Range of motion: Lumbar range of motion is acceptable.  Spinal Balance: Global saggital and coronal spinal balance acceptable, not significant for scoliosis and kyphosis.  Musculoskeletal: No pain with the range of motion of the bilateral hips. No trochanteric tenderness to palpation.  Vascular: Bilateral lower extremities warm and well perfused, dorsalis pedis pulses 2+ bilaterally.  Neurological: 3/5 strength in left hip, 4/5 in right hip. Altered sensation to light touch in BLE  Deep tendon reflexes symmetric 2+ in the bilateral lower extremities.  Negative Babinski bilaterally. Straight leg raise negative bilaterally.    IMAGING:      Today I personally reviewed CT lumbar that demonstrates mild degenerative changes    MRI lumbar demonstrates large L3-4 disc bulge    There is no height or weight on file to calculate BMI.    No results found for: "HGBA1C"        ASSESSMENT/PLAN:    There are no diagnoses linked to this encounter.    Today we discussed at length all of the different treatment options including anti-inflammatories, acetaminophen, " rest, ice, heat, physical therapy including strengthening and stretching exercises, home exercises, ROM, aerobic conditioning, aqua therapy, other modalities including ultrasound, massage, and dry needling, epidural steroid injections and finally surgical intervention.      Pt presents with acute lumbar radiculopathy with concerning weakness and perineal paresthesias. Spoke with Dr. Paul who recommends referral to Dr. Benavides for possible tubular MIS L3-4 given high BMI and risk for infection with open discectomy. Dr. Benavides's office will call with appt. Sent gabapentin to pharmacy

## 2024-11-08 PROBLEM — R26.89 DECREASED MOBILITY: Status: ACTIVE | Noted: 2024-11-08

## 2024-11-08 PROBLEM — R53.1 DECREASED STRENGTH: Status: ACTIVE | Noted: 2024-11-08

## 2024-11-08 NOTE — PROGRESS NOTES
[facility]   Physical Therapy Treatment Note      Name: [facility]aj [facility] Number: [id number]    Therapy Diagnosis:   Encounter Diagnoses   Name Primary?    Lumbar disc herniation Yes    Decreased strength     Decreased mobility      Physician: Lita Nina MD    Visit Date: 11/6/2024    Physician Orders: PT Eval and Treat  Medical Diagnosis from Referral: Degenerative Lumbar Disc Disease with Severe Spinal Canal Stenosis at L3-L4, Broad-based disc bulge at L4-L5 and L5-S1  Evaluation Date: 11/4/2024  Authorization Period Expiration: 11/4/2025  Plan of Care Expiration: 12/31/2024  Progress Note Due: 11/30/2024  Date of Surgery: NAD  Visit # / Visits authorized: 1/1, 1/20  FOTO: 1/3  PTA Visit #: 0/5      Precautions: NAD   Insurance: paying out of pocket, does not have health insurance.      Time In: 11:05 pm  Time Out: 12:00 pm  Total Billable Time: 55 minutes (1MT, 3TE)    Subjective     Patient reports: leg soreness and depending on RW to walk. Some slight improvement in symptoms.   He was compliant with home exercise program.  Response to previous treatment: leg soreness  Functional change: none at this time    Pain: 6/10  Location: hip and legs    Objective      Objective Measures updated at progress report unless specified.     Treatment     Pankaj received the treatments listed below:      manual therapy techniques: Joint mobilizations, Manual traction, Myofacial release, Manual Lymphatic Drainage, Soft tissue Mobilization, and Friction Massage were applied to the: lumbar spine for 15 minutes, including:  Prone PAs to T12-L5, grade I-II  Prone lumbar rocking, grade II-III    therapeutic exercises to develop strength, endurance, ROM, flexibility, posture, and core stabilization for 40 minutes including:  Myotomes, reflexes, slump testing  Prone laying: 3 minutes with double pillow under hips  Prone press ups on elbows with double pillow under hips: 4x5x5'' holds  with 10'' rest between reps  Prone passive rectus femoris stretch: 4x10'' Bilateral, PT assist    neuromuscular re-education activities to improve: Balance, Coordination, Kinesthetic, Sense, Proprioception, and Posture for 00 minutes. The following activities were included:  none    therapeutic activities to improve functional performance for 00 minutes, including:  None     Patient Education and Home Exercises       Education provided:   Importance of posture, neutral spine during activities, and lumbar support techniques  Instruction on body mechanics, especially avoiding forward bending and heavy lifting  Education on self-management techniques for acute pain episodes, including ice/heat and activity modification    Written Home Exercises Provided: Yes. Exercises were reviewed and Pankaj was able to demonstrate them prior to the end of the session.  Pankaj demonstrated good  understanding of the education provided. See Electronic Medical Record under Patient Instructions for exercises provided during therapy sessions    Assessment     Pankaj is a 29 y.o. male referred to outpatient Physical Therapy with a medical diagnosis of Degenerative Lumbar Disc Disease with Severe Spinal Canal Stenosis at L3-L4, Broad-based disc bulge at L4-L5 and L5-S1. Patient exhibits severe spinal canal stenosis at L3-L4 with symptoms consistent with lumbar radiculopathy, including positive straight leg raise and slump tests. Degenerative changes and facet arthropathy at L4-L5 and L5-S1 contribute to further lumbar mobility restrictions and pain, impacting functional activities. Neurological findings indicate mild motor weakness and sensory changes without severe neurological compromise, suggesting conservative management may be beneficial.    Reassessment to start session to assess response from last session. Pt reports slight changes from last session, continues to have left L4 motor weakness, Bilateral general foot  left>right numbness/tingling, negative clonus, hyporeflexia in BLE, + slump testing on left>right, and limited tolerance for all activities. Education on obtaining new RW that suits his height and weight, and to avoid heavy flexion based activities especially in the morning. Added on home exercise program for prone laying and gentle press ups every 3 hours if possible only to his tolerance. Requires AD still for mobility and safety. Has neurosurgeon followup on 11/13 and 11/15. Pt is paying out of pocket, does not have health insurance.     Alevism Is progressing well towards his goals.   Patient prognosis is Good.     Patient will continue to benefit from skilled outpatient physical therapy to address the deficits listed in the problem list box on initial evaluation, provide pt/family education and to maximize pt's level of independence in the home and community environment.     Patient's spiritual, cultural and educational needs considered and pt agreeable to plan of care and goals.     Anticipated barriers to physical therapy: obesity    Goals:  Short Term Goals: 3 weeks   Reduce pain by 25% during basic activities such as sitting, standing, and walking  Improve lumbar flexibility by 5-10° in flexion and extension  Achieve independent performance of a daily stretching and strengthening home exercise program  Reduce radicular symptoms during SLR by at least 10°     Long Term Goals: 8 weeks   Reduce overall pain by 50% during functional activities (e.g., prolonged sitting, walking)  Increase lumbar flexion and extension ROM by at least 20%  Improve core stability, allowing the patient to perform 20-second holds during stabilization exercises  Improve endurance for daily activities, enabling 20 minutes of continuous walking without significant pain  Return to moderate physical activity with appropriate lumbar protection techniques    Plan     Cont per POC.    Amadeo Lewis, PT

## 2024-11-12 ENCOUNTER — CLINICAL SUPPORT (OUTPATIENT)
Dept: REHABILITATION | Facility: HOSPITAL | Age: 29
End: 2024-11-12

## 2024-11-12 DIAGNOSIS — R53.1 DECREASED STRENGTH: ICD-10-CM

## 2024-11-12 DIAGNOSIS — M51.26 LUMBAR DISC HERNIATION: Primary | ICD-10-CM

## 2024-11-12 DIAGNOSIS — R26.89 DECREASED MOBILITY: ICD-10-CM

## 2024-11-12 PROCEDURE — 97140 MANUAL THERAPY 1/> REGIONS: CPT | Mod: PO

## 2024-11-12 PROCEDURE — 97110 THERAPEUTIC EXERCISES: CPT | Mod: PO

## 2024-11-12 NOTE — PROGRESS NOTES
[facility]   Physical Therapy Treatment Note      [id number]    Therapy Diagnosis:   Encounter Diagnoses   Name Primary?    Lumbar disc herniation Yes    Decreased strength     Decreased mobility      Physician: Lita Nina MD    Visit Date: 11/12/2024    Physician Orders: PT Eval and Treat  Medical Diagnosis from Referral: Degenerative Lumbar Disc Disease with Severe Spinal Canal Stenosis at L3-L4, Broad-based disc bulge at L4-L5 and L5-S1  Evaluation Date: 11/4/2024  Authorization Period Expiration: 11/4/2025  Plan of Care Expiration: 12/31/2024  Progress Note Due: 11/30/2024  Date of Surgery: NAD  Visit # / Visits authorized: 1/1, 1/20  FOTO: 1/3  PTA Visit #: 0/5      Precautions: NAD   Insurance: paying out of pocket, does not have health insurance.      Time In: 2:00 pm  Time Out: 3:00 pm  Total Billable Time: 60 minutes (1MT, 3TE)     Subjective      Patient reports: leg soreness and depending on RW to walk. Some slight improvement in symptoms.   He was compliant with home exercise program.  Response to previous treatment: leg soreness  Functional change: none at this time     Pain: 6/10  Location: hip and legs     Objective       Range of Motion (ROM):  Lumbar flexion: Limited with discomfort  Lumbar extension: Limited with significant pain  Lateral bending: Mild to moderate limitation bilaterally     Muscle Group Movement Strength RT  (0-5) Strength LT (0-5) Nerve Root Observations   Hip Flexors Hip Flexion   4+/5 L2-L3 Mild weakness   Quadriceps Knee Extension   5/5 L3-L4 Full strength   Hamstrings Knee Flexion   5/5 L5-S1 Normal   Tibialis Anterior Ankle Dorsiflexion   4+/5 L4-L5 Slight weakness   Gastrocnemius/Soleus Plantar Flexion   5/5 S1-S2 Normal   Gluteus Earl Hip Extension   4/5 S1 Mild to moderate weakness   Gluteus Medius/Minimus Hip Abduction   4+/5 L4-S1 Mild weakness   Peroneals Foot Eversion   4+/5 L5-S1 Mild  weakness   EHL (Extensor Hallucis Longus) Big Toe Extension   3-/5 L5 Moderate weakness   Adductors Hip Adduction   3+/5 L2-L4 Full strength      Tests & Measures:  SLR (Straight Leg Raise): Positive for radicular symptoms at 55°  Slump Test: Positive for increased symptoms in lumbar region and right lower extremity  Facet Joint Loading: Positive bilaterally, especially at L3-L4      Treatment      Pankaj received the treatments listed below:       Manual Therapy (15 minutes):  Joint Mobilizations: Prone PAs to T12-L5, grade I-II.  Prone lumbar rocking, grade II-III.  Techniques: Manual traction, myofascial release, manual lymphatic drainage, soft tissue mobilization, and friction massage focused on the lumbar spine.  Therapeutic Exercises (40 minutes):  Prone lying with double pillow under hips for spinal decompression: 3 minutes.  Prone press-ups on elbows with double pillow under hips: 4 sets of 5 reps, 5-second holds with 10-second rests.  Prone passive rectus femoris stretch: Bilaterally, PT-assisted, 4 reps of 10 seconds.  Neuromuscular Re-Education (10 minutes):  Abdominal core recruitment  Therapeutic Activity (00 minutes):  None performed this session.     Patient Education and Home Exercises        Patient Education:  Explained importance of neutral spine posture during daily activities and provided lumbar support techniques.  Advised on proper body mechanics, emphasizing avoidance of forward bending and heavy lifting.  Instructed on self-management for acute pain episodes, including cold/heat application and activity modification.  Pankaj demonstrated good comprehension and was able to execute the exercises with minimal cueing. Home exercise program instructions were reviewed and confirmed.     Written Home Exercises Provided: Yes. Exercises were reviewed and Pankaj was able to demonstrate them prior to the end of the session.  Pankaj demonstrated good  understanding of the education provided.  See Electronic Medical Record under Patient Instructions for exercises provided during therapy sessions     Assessment      Post session: Pankaj is responding well to manual therapy, demonstrating improved mobility and reduced discomfort immediately post-session.  Pain is moderately reduced, though exacerbated by daily activities, indicating ongoing limitations in functional endurance and core stability.  Limited ROM and muscle stiffness observed in the lumbar spine, necessitating further strengthening and flexibility work.    Pankaj is a 29 y.o. male referred to outpatient Physical Therapy with a medical diagnosis of Degenerative Lumbar Disc Disease with Severe Spinal Canal Stenosis at L3-L4, Broad-based disc bulge at L4-L5 and L5-S1. Patient exhibits severe spinal canal stenosis at L3-L4 with symptoms consistent with lumbar radiculopathy, including positive straight leg raise and slump tests. Degenerative changes and facet arthropathy at L4-L5 and L5-S1 contribute to further lumbar mobility restrictions and pain, impacting functional activities. Neurological findings indicate mild motor weakness and sensory changes without severe neurological compromise, suggesting conservative management may be beneficial.     Reassessment to start session to assess response from last session. Pt reports slight changes from last session, continues to have left L4 motor weakness, Bilateral general foot left>right numbness/tingling, negative clonus, hyporeflexia in BLE, + slump testing on left>right, and limited tolerance for all activities. Education on obtaining new RW that suits his height and weight, and to avoid heavy flexion based activities especially in the morning. Added on home exercise program for prone laying and gentle press ups every 3 hours if possible only to his tolerance. Requires AD still for mobility and safety. Has neurosurgeon followup on 11/13 and 11/15. Pt is paying out of pocket, does not have health  ritu.      Pankaj Is progressing well towards his goals.     Patient prognosis is Good.      Patient will continue to benefit from skilled outpatient physical therapy to address the deficits listed in the problem list box on initial evaluation, provide pt/family education and to maximize pt's level of independence in the home and community environment.      Patient's spiritual, cultural and educational needs considered and pt agreeable to plan of care and goals.     Anticipated barriers to physical therapy: obesity     Goals:  Short Term Goals: 3 weeks   Reduce pain by 25% during basic activities such as sitting, standing, and walking  Improve lumbar flexibility by 5-10° in flexion and extension  Achieve independent performance of a daily stretching and strengthening home exercise program  Reduce radicular symptoms during SLR by at least 10°     Long Term Goals: 8 weeks   Reduce overall pain by 50% during functional activities (e.g., prolonged sitting, walking)  Increase lumbar flexion and extension ROM by at least 20%  Improve core stability, allowing the patient to perform 20-second holds during stabilization exercises  Improve endurance for daily activities, enabling 20 minutes of continuous walking without significant pain  Return to moderate physical activity with appropriate lumbar protection techniques     Plan      Plan of Care (3 Months):  Therapeutic Exercise: Continue strengthening exercises focused on core stabilization, lumbar flexibility, and posture control. Progress to higher resistance as tolerated.  Manual Therapy: Maintain joint mobilizations and manual techniques to improve soft tissue mobility and relieve spinal discomfort.  Neuromuscular Re-Education: Gradually introduce balance and proprioceptive exercises to reinforce posture and body alignment.  Therapeutic Activities: Incorporate task-specific functional activities as pain and mobility improve.  Patient Education and Home Exercise  Program: Reinforce techniques for posture, core stability, and pain management strategies.  Follow-up sessions will assess progress toward short-term and long-term goals, with adjustments as needed.    Yanna Morales, PT   11/12/2024

## 2024-11-13 ENCOUNTER — PATIENT MESSAGE (OUTPATIENT)
Dept: ORTHOPEDICS | Facility: CLINIC | Age: 29
End: 2024-11-13

## 2024-11-13 ENCOUNTER — OFFICE VISIT (OUTPATIENT)
Dept: ORTHOPEDICS | Facility: CLINIC | Age: 29
End: 2024-11-13

## 2024-11-13 VITALS — HEIGHT: 76 IN | WEIGHT: 315 LBS | BODY MASS INDEX: 38.36 KG/M2

## 2024-11-13 DIAGNOSIS — M51.26 LUMBAR HERNIATED DISC: ICD-10-CM

## 2024-11-13 DIAGNOSIS — M54.16 LUMBAR RADICULOPATHY: ICD-10-CM

## 2024-11-13 PROCEDURE — 99999 PR PBB SHADOW E&M-EST. PATIENT-LVL III: CPT | Mod: PBBFAC,,, | Performed by: ORTHOPAEDIC SURGERY

## 2024-11-13 PROCEDURE — 99213 OFFICE O/P EST LOW 20 MIN: CPT | Mod: PBBFAC | Performed by: ORTHOPAEDIC SURGERY

## 2024-11-13 PROCEDURE — 99204 OFFICE O/P NEW MOD 45 MIN: CPT | Mod: S$PBB,,, | Performed by: ORTHOPAEDIC SURGERY

## 2024-11-13 RX ORDER — GABAPENTIN 400 MG/1
400 CAPSULE ORAL 3 TIMES DAILY
Qty: 90 CAPSULE | Refills: 11 | Status: SHIPPED | OUTPATIENT
Start: 2024-11-13 | End: 2025-11-13

## 2024-11-14 ENCOUNTER — CLINICAL SUPPORT (OUTPATIENT)
Dept: REHABILITATION | Facility: HOSPITAL | Age: 29
End: 2024-11-14

## 2024-11-14 ENCOUNTER — PATIENT MESSAGE (OUTPATIENT)
Dept: ORTHOPEDICS | Facility: CLINIC | Age: 29
End: 2024-11-14

## 2024-11-14 DIAGNOSIS — M51.26 LUMBAR DISC HERNIATION: Primary | ICD-10-CM

## 2024-11-14 DIAGNOSIS — R53.1 DECREASED STRENGTH: ICD-10-CM

## 2024-11-14 DIAGNOSIS — R26.89 DECREASED MOBILITY: ICD-10-CM

## 2024-11-14 PROCEDURE — 97140 MANUAL THERAPY 1/> REGIONS: CPT | Mod: PO

## 2024-11-14 PROCEDURE — 97110 THERAPEUTIC EXERCISES: CPT | Mod: PO

## 2024-11-15 NOTE — PROGRESS NOTES
[facility]   Physical Therapy Treatment Note      [id number]    Therapy Diagnosis:   Encounter Diagnoses   Name Primary?    Lumbar disc herniation Yes    Decreased strength     Decreased mobility      Physician: Lita Nina MD    Visit Date: 11/14/2024    Physician Orders: PT Eval and Treat  Medical Diagnosis from Referral: Degenerative Lumbar Disc Disease with Severe Spinal Canal Stenosis at L3-L4, Broad-based disc bulge at L4-L5 and L5-S1  Evaluation Date: 11/4/2024  Authorization Period Expiration: 11/4/2025  Plan of Care Expiration: 12/31/2024  Progress Note Due: 11/30/2024  Date of Surgery: NAD  Visit # / Visits authorized: 1/1, 1/20  FOTO: 1/3  PTA Visit #: 0/5      Precautions: NAD   Insurance: paying out of pocket, does not have health insurance.      Time In: 3:00 pm  Time Out: 4:00 pm  Total Billable Time: 60 minutes (1MT, 3TE)    PTA Visit #: 0/5     Subjective      Patient reports: leg soreness and depending on RW to walk. Some slight improvement in symptoms.   He was compliant with home exercise program.  Response to previous treatment: leg soreness  Functional change: none at this time     Pain: 6/10  Location: hip and legs     Objective       Range of Motion (ROM):  Lumbar flexion: Limited with discomfort  Lumbar extension: Limited with significant pain  Lateral bending: Mild to moderate limitation bilaterally     Muscle Group Movement Strength RT  (0-5) Strength LT (0-5) Nerve Root Observations   Hip Flexors Hip Flexion   3+/5 L2-L3 Mild weakness   Quadriceps Knee Extension   4/5 L3-L4 Full strength   Hamstrings Knee Flexion   3+/5 L5-S1 Normal   Tibialis Anterior Ankle Dorsiflexion   3+/5 L4-L5 Slight weakness   Gastrocnemius/Soleus Plantar Flexion   5/5 S1-S2 Normal   Gluteus Earl Hip Extension   3+/5 S1 Mild to moderate weakness   Gluteus Medius/Minimus Hip Abduction   4/5 L4-S1 Mild weakness   Peroneals Foot Eversion    4+/5 L5-S1 Mild weakness   EHL (Extensor Hallucis Longus) Big Toe Extension   3-/5 L5 Moderate weakness   Adductors Hip Adduction   3+/5 L2-L4 Full strength      Tests & Measures:  SLR (Straight Leg Raise): Positive for radicular symptoms at 55°  Slump Test: Positive for increased symptoms in lumbar region and right lower extremity  Facet Joint Loading: Positive bilaterally, especially at L3-L4      Treatment      Pankaj received the treatments listed below:       Manual Therapy (15 minutes):  Joint Mobilizations: Prone PAs to T12-L5, grade I-II.  Prone lumbar rocking, grade II-III.  Techniques: Manual traction, myofascial release, manual lymphatic drainage, soft tissue mobilization, and friction massage focused on the lumbar spine.  Therapeutic Exercises (30 minutes):  Prone lying with double pillow under hips for spinal decompression: 3 minutes.  Prone press-ups on elbows with double pillow under hips: 4 sets of 5 reps, 5-second holds with 10-second rests.  Prone passive rectus femoris stretch: Bilaterally, PT-assisted, 4 reps of 10 seconds.  Neuromuscular Re-Education (15 minutes):  Abdominal core recruitment  Therapeutic Activity (00 minutes):  None performed this session.     Patient Education and Home Exercises        Patient Education:  Explained importance of neutral spine posture during daily activities and provided lumbar support techniques.  Advised on proper body mechanics, emphasizing avoidance of forward bending and heavy lifting.  Instructed on self-management for acute pain episodes, including cold/heat application and activity modification.  Pankaj demonstrated good comprehension and was able to execute the exercises with minimal cueing. Home exercise program instructions were reviewed and confirmed.     Written Home Exercises Provided: Yes. Exercises were reviewed and Pankaj was able to demonstrate them prior to the end of the session.  Pankaj demonstrated good  understanding of the  education provided. See Electronic Medical Record under Patient Instructions for exercises provided during therapy sessions     Assessment      Post session: Pankaj is responding well to manual therapy, demonstrating improved mobility and reduced discomfort immediately post-session.  Pain is moderately reduced, though exacerbated by daily activities, indicating ongoing limitations in functional endurance and core stability.  Limited ROM and muscle stiffness observed in the lumbar spine, necessitating further strengthening and flexibility work.     Pankaj is a 29 y.o. male referred to outpatient Physical Therapy with a medical diagnosis of Degenerative Lumbar Disc Disease with Severe Spinal Canal Stenosis at L3-L4, Broad-based disc bulge at L4-L5 and L5-S1. Patient exhibits severe spinal canal stenosis at L3-L4 with symptoms consistent with lumbar radiculopathy, including positive straight leg raise and slump tests. Degenerative changes and facet arthropathy at L4-L5 and L5-S1 contribute to further lumbar mobility restrictions and pain, impacting functional activities. Neurological findings indicate mild motor weakness and sensory changes without severe neurological compromise, suggesting conservative management may be beneficial.     Reassessment to start session to assess response from last session. Pt reports slight changes from last session, continues to have left L4 motor weakness, Bilateral general foot left>right numbness/tingling, negative clonus, hyporeflexia in BLE, + slump testing on left>right, and limited tolerance for all activities. Education on obtaining new RW that suits his height and weight, and to avoid heavy flexion based activities especially in the morning. Added on home exercise program for prone laying and gentle press ups every 3 hours if possible only to his tolerance. Requires AD still for mobility and safety. Has neurosurgeon followup on 11/13 and 11/15. Pt is paying out of pocket,  does not have health insurance.      Pankaj Is progressing well towards his goals.     Patient prognosis is Good.      Patient will continue to benefit from skilled outpatient physical therapy to address the deficits listed in the problem list box on initial evaluation, provide pt/family education and to maximize pt's level of independence in the home and community environment.      Patient's spiritual, cultural and educational needs considered and pt agreeable to plan of care and goals.     Anticipated barriers to physical therapy: obesity     Goals:  Short Term Goals: 3 weeks   Reduce pain by 25% during basic activities such as sitting, standing, and walking  Improve lumbar flexibility by 5-10° in flexion and extension  Achieve independent performance of a daily stretching and strengthening home exercise program  Reduce radicular symptoms during SLR by at least 10°     Long Term Goals: 8 weeks   Reduce overall pain by 50% during functional activities (e.g., prolonged sitting, walking)  Increase lumbar flexion and extension ROM by at least 20%  Improve core stability, allowing the patient to perform 20-second holds during stabilization exercises  Improve endurance for daily activities, enabling 20 minutes of continuous walking without significant pain  Return to moderate physical activity with appropriate lumbar protection techniques     Plan      Plan of Care (3 Months):  Therapeutic Exercise: Continue strengthening exercises focused on core stabilization, lumbar flexibility, and posture control. Progress to higher resistance as tolerated.  Manual Therapy: Maintain joint mobilizations and manual techniques to improve soft tissue mobility and relieve spinal discomfort.  Neuromuscular Re-Education: Gradually introduce balance and proprioceptive exercises to reinforce posture and body alignment.  Therapeutic Activities: Incorporate task-specific functional activities as pain and mobility improve.  Patient  Education and Home Exercise Program: Reinforce techniques for posture, core stability, and pain management strategies.  Follow-up sessions will assess progress toward short-term and long-term goals, with adjustments as needed.    Yanna Morales, PT   11/14/2024

## 2024-11-17 ENCOUNTER — HOSPITAL ENCOUNTER (EMERGENCY)
Facility: HOSPITAL | Age: 29
Discharge: HOME | End: 2024-11-17
Attending: EMERGENCY MEDICINE

## 2024-11-17 VITALS
DIASTOLIC BLOOD PRESSURE: 70 MMHG | TEMPERATURE: 98 F | RESPIRATION RATE: 18 BRPM | BODY MASS INDEX: 38.36 KG/M2 | SYSTOLIC BLOOD PRESSURE: 106 MMHG | OXYGEN SATURATION: 97 % | HEIGHT: 76 IN | HEART RATE: 72 BPM | WEIGHT: 315 LBS

## 2024-11-17 DIAGNOSIS — R25.2 SPASM: Primary | ICD-10-CM

## 2024-11-17 LAB
ALBUMIN SERPL BCP-MCNC: 4 G/DL (ref 3.5–5.2)
ALP SERPL-CCNC: 60 U/L (ref 40–150)
ALT SERPL W/O P-5'-P-CCNC: 42 U/L (ref 10–44)
ANION GAP SERPL CALC-SCNC: 10 MMOL/L (ref 8–16)
AST SERPL-CCNC: 22 U/L (ref 10–40)
BASOPHILS # BLD AUTO: 0.05 K/UL (ref 0–0.2)
BASOPHILS NFR BLD: 0.4 % (ref 0–1.9)
BILIRUB SERPL-MCNC: 0.9 MG/DL (ref 0.1–1)
BUN SERPL-MCNC: 13 MG/DL (ref 6–20)
CALCIUM SERPL-MCNC: 10 MG/DL (ref 8.7–10.5)
CHLORIDE SERPL-SCNC: 103 MMOL/L (ref 95–110)
CO2 SERPL-SCNC: 28 MMOL/L (ref 23–29)
CREAT SERPL-MCNC: 0.8 MG/DL (ref 0.5–1.4)
DIFFERENTIAL METHOD BLD: ABNORMAL
EOSINOPHIL # BLD AUTO: 0.3 K/UL (ref 0–0.5)
EOSINOPHIL NFR BLD: 2.7 % (ref 0–8)
ERYTHROCYTE [DISTWIDTH] IN BLOOD BY AUTOMATED COUNT: 12.8 % (ref 11.5–14.5)
EST. GFR  (NO RACE VARIABLE): >60 ML/MIN/1.73 M^2
GLUCOSE SERPL-MCNC: 97 MG/DL (ref 70–110)
HCT VFR BLD AUTO: 46.9 % (ref 40–54)
HGB BLD-MCNC: 16.2 G/DL (ref 14–18)
IMM GRANULOCYTES # BLD AUTO: 0.11 K/UL (ref 0–0.04)
IMM GRANULOCYTES NFR BLD AUTO: 0.9 % (ref 0–0.5)
LYMPHOCYTES # BLD AUTO: 2.5 K/UL (ref 1–4.8)
LYMPHOCYTES NFR BLD: 21.4 % (ref 18–48)
MAGNESIUM SERPL-MCNC: 1.9 MG/DL (ref 1.6–2.6)
MCH RBC QN AUTO: 30.6 PG (ref 27–31)
MCHC RBC AUTO-ENTMCNC: 34.5 G/DL (ref 32–36)
MCV RBC AUTO: 89 FL (ref 82–98)
MONOCYTES # BLD AUTO: 0.7 K/UL (ref 0.3–1)
MONOCYTES NFR BLD: 5.7 % (ref 4–15)
NEUTROPHILS # BLD AUTO: 8.2 K/UL (ref 1.8–7.7)
NEUTROPHILS NFR BLD: 68.9 % (ref 38–73)
NRBC BLD-RTO: 0 /100 WBC
PHOSPHATE SERPL-MCNC: 3.9 MG/DL (ref 2.7–4.5)
PLATELET # BLD AUTO: 252 K/UL (ref 150–450)
PMV BLD AUTO: 10.6 FL (ref 9.2–12.9)
POTASSIUM SERPL-SCNC: 4.5 MMOL/L (ref 3.5–5.1)
PROT SERPL-MCNC: 7.1 G/DL (ref 6–8.4)
RBC # BLD AUTO: 5.29 M/UL (ref 4.6–6.2)
SODIUM SERPL-SCNC: 141 MMOL/L (ref 136–145)
WBC # BLD AUTO: 11.85 K/UL (ref 3.9–12.7)

## 2024-11-17 PROCEDURE — 85025 COMPLETE CBC W/AUTO DIFF WBC: CPT | Performed by: EMERGENCY MEDICINE

## 2024-11-17 PROCEDURE — 63600175 PHARM REV CODE 636 W HCPCS: Performed by: EMERGENCY MEDICINE

## 2024-11-17 PROCEDURE — 96374 THER/PROPH/DIAG INJ IV PUSH: CPT

## 2024-11-17 PROCEDURE — 25000003 PHARM REV CODE 250: Performed by: EMERGENCY MEDICINE

## 2024-11-17 PROCEDURE — 80053 COMPREHEN METABOLIC PANEL: CPT | Performed by: EMERGENCY MEDICINE

## 2024-11-17 PROCEDURE — 83735 ASSAY OF MAGNESIUM: CPT | Performed by: EMERGENCY MEDICINE

## 2024-11-17 PROCEDURE — 84100 ASSAY OF PHOSPHORUS: CPT | Performed by: EMERGENCY MEDICINE

## 2024-11-17 PROCEDURE — 99284 EMERGENCY DEPT VISIT MOD MDM: CPT | Mod: 25

## 2024-11-17 RX ORDER — METHOCARBAMOL 500 MG/1
1000 TABLET, FILM COATED ORAL
Status: COMPLETED | OUTPATIENT
Start: 2024-11-17 | End: 2024-11-17

## 2024-11-17 RX ORDER — METHOCARBAMOL 500 MG/1
1000 TABLET, FILM COATED ORAL 3 TIMES DAILY PRN
Qty: 20 TABLET | Refills: 0 | Status: SHIPPED | OUTPATIENT
Start: 2024-11-17 | End: 2024-11-21 | Stop reason: CLARIF

## 2024-11-17 RX ORDER — METHYLPREDNISOLONE 4 MG/1
TABLET ORAL
Qty: 21 EACH | Refills: 0 | Status: SHIPPED | OUTPATIENT
Start: 2024-11-17

## 2024-11-17 RX ORDER — NAPROXEN 500 MG/1
500 TABLET ORAL 2 TIMES DAILY PRN
Qty: 30 TABLET | Refills: 0 | Status: SHIPPED | OUTPATIENT
Start: 2024-11-17

## 2024-11-17 RX ORDER — METHOCARBAMOL 500 MG/1
1000 TABLET, FILM COATED ORAL
Status: DISCONTINUED | OUTPATIENT
Start: 2024-11-17 | End: 2024-11-17

## 2024-11-17 RX ORDER — KETOROLAC TROMETHAMINE 30 MG/ML
15 INJECTION, SOLUTION INTRAMUSCULAR; INTRAVENOUS
Status: COMPLETED | OUTPATIENT
Start: 2024-11-17 | End: 2024-11-17

## 2024-11-17 RX ADMIN — METHOCARBAMOL 1000 MG: 500 TABLET ORAL at 02:11

## 2024-11-17 RX ADMIN — KETOROLAC TROMETHAMINE 15 MG: 30 INJECTION, SOLUTION INTRAMUSCULAR; INTRAVENOUS at 02:11

## 2024-11-17 NOTE — ED TRIAGE NOTES
Pankaj Bustamante, a 29 y.o. male presents to the ED w/ complaint of muscles spasms starts at tail bone and shoots down right leg. And bilateral foot swelling.     Triage note:  Chief Complaint   Patient presents with    Spasms     Pt reports history of herniated disk. Pt states he's now having spasms in the legs.     Review of patient's allergies indicates:  No Known Allergies  No past medical history on file.      APPEARANCE: awake and alert in NAD. PAIN  0/10  SKIN: warm, dry and intact. No breakdown or bruising.  MUSCULOSKELETAL: Patient moving all extremities spontaneously, no obvious swelling or deformities noted. Ambulates independently. Muscle spasms and bilateral foot swelling and numbenss  RESPIRATORY: Denies shortness of breath.Respirations unlabored.   CARDIAC: Denies CP, 2+ distal pulses; no peripheral edema  ABDOMEN: S/ND/NT, Denies nausea  : voids spontaneously, denies difficulty  Neurologic: AAO x 4; follows commands equal strength in all extremities; denies numbness/tingling. Denies dizziness

## 2024-11-17 NOTE — ED PROVIDER NOTES
Encounter Date: 11/17/2024       History     Chief Complaint   Patient presents with    Spasms     Pt reports history of herniated disk. Pt states he's now having spasms in the legs.     HPI  29-year-old male with past medical history as noted below, followed by our spine team for spinal stenosis, MRI in late October showed severe spinal canal stenosis most pronounced at L3-L4, attempting to schedule an outpatient decompression surgery for lower extremity weakness and peritoneal numbness, coming in with new symptoms of right leg spasms.  He says he has not had this before no clear triggers, his entire leg spasms feels like he is having uncontrolled muscle contraction in his right leg along with pain shooting down his leg.  He denies current or active back pain.  No new numbness, no new weakness, no new falls or trauma.    He has been taking gabapentin 400 3 times a day with minimal improvement.  He has tried steroids PACs in the past as well as Tylenol and does not feel like these work well for him.    Says that since this started in October he was to ambulate with a walker, transiently improves slightly with PT but is no where near his baseline.    They are frustrated because the next appointment with the surgeon to schedule his surgery is in January.    Review of patient's allergies indicates:  No Known Allergies  History reviewed. No pertinent past medical history.  History reviewed. No pertinent surgical history.  No family history on file.  Social History     Tobacco Use    Smoking status: Every Day     Types: Cigarettes    Smokeless tobacco: Never   Substance Use Topics    Alcohol use: Yes     Comment: occ    Drug use: Never     Review of Systems    Physical Exam     Initial Vitals [11/17/24 1209]   BP Pulse Resp Temp SpO2   (!) 160/81 90 20 98.1 °F (36.7 °C) 97 %      MAP       --         Physical Exam    Physical Exam:  CONSTITUTIONAL: Well developed, elevated BMI, in no acute distress.  HENT: Normocephalic,  atraumatic    EYES: Sclerae anicteric   NECK: Supple, no thyroid enlargement  RESPIRATORY: Speaking in full sentences. Breathing comfortably. Auscultation of the lungs revealed normal breath sounds b/l, no wheezing, no rales, no rhonchi.  ABDOMEN: Soft and nontender, no masses, no rebound or guarding   NEUROLOGIC: Alert, interacting normally. No facial droop. Voice is clear. Speech is fluent.  3/5 strength in the left lower extremity hip and thigh.  5/5 strength right lower extremity.  Similar sensation to light touch bilaterally lower extremities.  Straight leg raise on the right is negative.  MSK: There is no C, T or L-spine tenderness.  No deformity or tenderness to palpation to the bilateral lower extremities.  SKIN: Warm and dry. No visible rash on exposed areas of skin.    Psych: Mood and affect normal.       ED Course   Procedures  Labs Reviewed   CBC W/ AUTO DIFFERENTIAL - Abnormal       Result Value    WBC 11.85      RBC 5.29      Hemoglobin 16.2      Hematocrit 46.9      MCV 89      MCH 30.6      MCHC 34.5      RDW 12.8      Platelets 252      MPV 10.6      Immature Granulocytes 0.9 (*)     Gran # (ANC) 8.2 (*)     Immature Grans (Abs) 0.11 (*)     Lymph # 2.5      Mono # 0.7      Eos # 0.3      Baso # 0.05      nRBC 0      Gran % 68.9      Lymph % 21.4      Mono % 5.7      Eosinophil % 2.7      Basophil % 0.4      Differential Method Automated     COMPREHENSIVE METABOLIC PANEL    Sodium 141      Potassium 4.5      Chloride 103      CO2 28      Glucose 97      BUN 13      Creatinine 0.8      Calcium 10.0      Total Protein 7.1      Albumin 4.0      Total Bilirubin 0.9      Alkaline Phosphatase 60      AST 22      ALT 42      eGFR >60.0      Anion Gap 10     MAGNESIUM    Magnesium 1.9     PHOSPHORUS    Phosphorus 3.9            Imaging Results    None          Medications   ketorolac injection 15 mg (15 mg Intravenous Given 11/17/24 1417)   methocarbamoL tablet 1,000 mg (1,000 mg Oral Given 11/17/24 1417)      Medical Decision Making  Amount and/or Complexity of Data Reviewed  Independent Historian: caregiver     Details: Part of history obtained from outside source.  External Data Reviewed: notes.     Details: Recently seen spine clinic:    Neurological: 3/5 strength in left hip, 4/5 in right hip. Altered sensation to light touch in BLE    Pt presents with acute lumbar radiculopathy with concerning weakness and perineal paresthesias. Spoke with Dr. Paul who recommends referral to Dr. Benavides for possible tubular MIS L3-4 given high BMI and risk for infection with open discectomy. Dr. Benavides's office will call with appt. Sent gabapentin to pharmacy     Labs: ordered.    Risk  Prescription drug management.      29-year-old male with past history as noted coming in with new right-sided leg spasms in the setting of known spinal stenosis with left leg weakness and peroneal numbness.  Recent MRI reviewed.    Concern for ongoing symptoms from his spinal canal stenosis possible sciatica symptoms at this time versus nerve causing muscle spasms.    His exam is largely unchanged from his recent orthopedic spine visit.  His ambulation is at baseline.    No new urinary incontinence or difference in his saddle anesthesia.      Currently in the emergency department he was not experiencing the right leg spasms.    Will check labs for any metabolic hematologic abnormalities, give Robaxin and Toradol for pain control.      Given the fact that he has no new numbness, no new weakness, no active spasms in the emergency department, recent spine imaging, closely followed by spine ortho and referred to Neurosurgery at this time there is no indication for emergent repeat imaging.    Will discuss with Neurosurgery as he does have some concerning findings in January he has a prolonged time to wait for intervention.    Update:  In the ED he remains well-appearing hemodynamically stable.    No spasms happening in the right leg.  Labs are  unremarkable.      Discussed with Neurosurgery on-call, they will attempt to expedite his workup and see him next week.    I will treat him at home with Robaxin, naproxen and he continued to take his gabapentin.    Also prescribed a Medrol Dosepak which she can take if knee above medication regimen does not working.    ED return precautions for any worsening numbness, worsening weakness, bowel or bladder incontinence, uncontrolled pain, or any other new, worsening worrisome symptoms.    Findings of ED work up and return precautions verbally explained to patient. Patient agrees with discharge plan, return instructions and verbalizes understanding of return precautions.                                       Clinical Impression:  Final diagnoses:  [R25.2] Spasm (Primary)          ED Disposition Condition    Discharge Stable          ED Prescriptions       Medication Sig Dispense Start Date End Date Auth. Provider    methocarbamoL (ROBAXIN) 500 MG Tab Take 2 tablets (1,000 mg total) by mouth 3 (three) times daily as needed (muscle pain). Do not drive or operate heavy machinery while taking this medication. 20 tablet 11/17/2024 11/22/2024 Deandre Morrison MD    naproxen (NAPROSYN) 500 MG tablet Take 1 tablet (500 mg total) by mouth 2 (two) times daily as needed (pain). Take with meals. 30 tablet 11/17/2024 -- Deandre Morrison MD    methylPREDNISolone (MEDROL DOSEPACK) 4 mg tablet use as directed 21 each 11/17/2024 -- Deandre Morrison MD          Follow-up Information       Follow up With Specialties Details Why Contact Info Additional Information    [facility] - Neurosurgery [facility]   [address]  [facility] [address] [phone] 8th Floor [facility] [place] Please park in [facility]. Check in desk is located in the lobby. Please take the C elevator to 8th floor which opens to the lobby.             Deandre Morrison MD  11/18/24 [id number]

## 2024-11-17 NOTE — DISCHARGE INSTRUCTIONS
I spoke with Dr. Maldonado Nicholas confirmed with the Neurosurgery team.  They will attempt to give you an expedited follow-up hopefully this week.    At home you can take gabapentin, Robaxin and naproxen for pain control.  You were given a Medrol Dosepak which you can also try.    If you develop inability to control your urine or stool, worsening numbness, worsening weakness, or any other new, worsening worrisome symptoms please return immediately to the emergency department.

## 2024-11-18 ENCOUNTER — TELEPHONE (OUTPATIENT)
Dept: NEUROSURGERY | Facility: CLINIC | Age: 29
End: 2024-11-18

## 2024-11-18 NOTE — TELEPHONE ENCOUNTER
----- Message from Maldonado Nicholas sent at 11/17/2024  3:28 PM CST -----  Patient adamantly requesting to be seen and managed by Dr Benavides. Was seeing if could get him into [facility] this week for over a month of lumabr radiculopathy perssitent after conservative management. thanks

## 2024-11-19 ENCOUNTER — TELEPHONE (OUTPATIENT)
Dept: NEUROSURGERY | Facility: CLINIC | Age: 29
End: 2024-11-19

## 2024-11-19 ENCOUNTER — OFFICE VISIT (OUTPATIENT)
Dept: NEUROSURGERY | Facility: CLINIC | Age: 29
End: 2024-11-19

## 2024-11-19 DIAGNOSIS — M51.26 HERNIATED NUCLEUS PULPOSUS, LUMBAR: ICD-10-CM

## 2024-11-19 DIAGNOSIS — M54.16 LUMBAR RADICULOPATHY: ICD-10-CM

## 2024-11-19 DIAGNOSIS — M51.26 LUMBAR DISC HERNIATION: Primary | ICD-10-CM

## 2024-11-19 DIAGNOSIS — M51.26 LUMBAR HERNIATED DISC: ICD-10-CM

## 2024-11-19 DIAGNOSIS — M51.26 LUMBAR DISC HERNIATION: ICD-10-CM

## 2024-11-19 DIAGNOSIS — M54.10 RADICULOPATHY, UNSPECIFIED SPINAL REGION: Primary | ICD-10-CM

## 2024-11-19 PROCEDURE — 99999 PR PBB SHADOW E&M-EST. PATIENT-LVL II: CPT | Mod: PBBFAC,,, | Performed by: STUDENT IN AN ORGANIZED HEALTH CARE EDUCATION/TRAINING PROGRAM

## 2024-11-19 PROCEDURE — 99212 OFFICE O/P EST SF 10 MIN: CPT | Mod: PBBFAC | Performed by: STUDENT IN AN ORGANIZED HEALTH CARE EDUCATION/TRAINING PROGRAM

## 2024-11-19 PROCEDURE — 99214 OFFICE O/P EST MOD 30 MIN: CPT | Mod: S$PBB,,, | Performed by: STUDENT IN AN ORGANIZED HEALTH CARE EDUCATION/TRAINING PROGRAM

## 2024-11-19 NOTE — PROGRESS NOTES
Neurosurgery  History & Physical    SUBJECTIVE:     Chief Complaint: Bilateral leg numbness/weakness    History of Present Illness:  29 M with hx of morbid obesity(BMI of 45), smoker presents for evaluation of one month of BLE numbness, perineal numbness, and leg weakness.  He denies incontinence or overt saddle anesthesia.  He is ambulatory with a walker and is currently in PT.  He feels that his legs are getting slightly stronger with PT however it hasn't improved his leg numbness.  He also will get left greater than right muscle spasms in his legs into his posterior calves.    Review of patient's allergies indicates:  No Known Allergies    Current Outpatient Medications   Medication Sig Dispense Refill    gabapentin (NEURONTIN) 400 MG capsule Take 1 capsule (400 mg total) by mouth 3 (three) times daily. 90 capsule 11    methocarbamoL (ROBAXIN) 500 MG Tab Take 2 tablets (1,000 mg total) by mouth 3 (three) times daily as needed (muscle pain). Do not drive or operate heavy machinery while taking this medication. 20 tablet 0    methylPREDNISolone (MEDROL DOSEPACK) 4 mg tablet use as directed 21 each 0    naproxen (NAPROSYN) 500 MG tablet Take 1 tablet (500 mg total) by mouth 2 (two) times daily as needed (pain). Take with meals. 30 tablet 0     No current facility-administered medications for this visit.       No past medical history on file.  No past surgical history on file.  Family History    None       Social History     Socioeconomic History    Marital status: Single   Tobacco Use    Smoking status: Every Day     Types: Cigarettes    Smokeless tobacco: Never   Substance and Sexual Activity    Alcohol use: Yes     Comment: occ    Drug use: Never     Social Drivers of Health     Financial Resource Strain: Low Risk  (11/11/2024)    Overall Financial Resource Strain (CARDIA)     Difficulty of Paying Living Expenses: Not very hard   Food Insecurity: No Food Insecurity (11/11/2024)    Hunger Vital Sign     Worried  About Running Out of Food in the Last Year: Never true     Ran Out of Food in the Last Year: Never true   Physical Activity: Sufficiently Active (11/11/2024)    Exercise Vital Sign     Days of Exercise per Week: 6 days     Minutes of Exercise per Session: 150+ min   Stress: Stress Concern Present (11/11/2024)    [employer] - Occupational Stress Questionnaire     Feeling of Stress : To some extent   Housing Stability: Unknown (11/11/2024)    Housing Stability Vital Sign     Unable to Pay for Housing in the Last Year: No       Review of Systems  14 point ROS was negative    OBJECTIVE:     Vital Signs  Pain Score:   3  There is no height or weight on file to calculate BMI.      Physical Exam:    Constitutional: He appears well-developed and well-nourished.     Eyes: Pupils are equal, round, and reactive to light.     Cardiovascular: Normal rate and regular rhythm.     Abdominal: Soft.     Psych/Behavior: He is alert. He is oriented to person, place, and time. He has a normal mood and affect.     Musculoskeletal: Gait is abnormal.        Neck: Range of motion is limited.        Back: Range of motion is limited.     Neurological:        DTRs: DTRs are DTRS NORMAL AND SYMMETRICnormal and symmetric.        Cranial nerves: Cranial nerve(s) II, III, IV, V, VI, VII, VIII, IX, X, XI and XII are intact.     5/5 in BUE  4+/5 in BLE    Diminished sensation from knees to feet bilaterally    Diagnostic Results:  CT L: no acute fractures  MRI L: congenitally narrowed spinal canal.  There is moderate to severe central stenosis at L3/4.  Reviewed    ASSESSMENT/PLAN:     29 M with hx of morbid obesity, tobacco abuse presents for eval of one month of BLE numbness, weakness, and pain.  He has no incontinence.  His MRI L spine is described above which shows significant stenosis at L3/4. Ultimately I think he will require a MIS L3/4 decompression which I will plan to do 12/11/24.  I also think an L3/4 MAGNUS is  worthwhile in the interim.  Should the patient develop bowel/bladder incontinence he would require more urgent intervention.

## 2024-11-20 DIAGNOSIS — M48.061 SPINAL STENOSIS OF LUMBAR REGION, UNSPECIFIED WHETHER NEUROGENIC CLAUDICATION PRESENT: Primary | ICD-10-CM

## 2024-11-21 ENCOUNTER — PATIENT MESSAGE (OUTPATIENT)
Dept: PAIN MEDICINE | Facility: OTHER | Age: 29
End: 2024-11-21

## 2024-11-21 DIAGNOSIS — Z01.818 PREOPERATIVE TESTING: Primary | ICD-10-CM

## 2024-11-22 ENCOUNTER — CLINICAL SUPPORT (OUTPATIENT)
Dept: REHABILITATION | Facility: HOSPITAL | Age: 29
End: 2024-11-22

## 2024-11-22 ENCOUNTER — TELEPHONE (OUTPATIENT)
Dept: PREADMISSION TESTING | Facility: HOSPITAL | Age: 29
End: 2024-11-22

## 2024-11-22 ENCOUNTER — PATIENT MESSAGE (OUTPATIENT)
Dept: ADMINISTRATIVE | Facility: OTHER | Age: 29
End: 2024-11-22

## 2024-11-22 ENCOUNTER — PATIENT MESSAGE (OUTPATIENT)
Dept: PAIN MEDICINE | Facility: OTHER | Age: 29
End: 2024-11-22

## 2024-11-22 DIAGNOSIS — M51.26 LUMBAR DISC HERNIATION: Primary | ICD-10-CM

## 2024-11-22 DIAGNOSIS — R53.1 DECREASED STRENGTH: ICD-10-CM

## 2024-11-22 DIAGNOSIS — R26.89 DECREASED MOBILITY: ICD-10-CM

## 2024-11-22 PROCEDURE — 97110 THERAPEUTIC EXERCISES: CPT | Mod: PO

## 2024-11-22 NOTE — PROGRESS NOTES
[facility]   Physical Therapy Treatment Note      [id number]    Therapy Diagnosis:   Encounter Diagnoses   Name Primary?    Lumbar disc herniation Yes    Decreased strength     Decreased mobility      Physician: Lita Nina MD    Visit Date: 11/22/2024    Physician Orders: PT Eval and Treat  Medical Diagnosis from Referral: Degenerative Lumbar Disc Disease with Severe Spinal Canal Stenosis at L3-L4, Broad-based disc bulge at L4-L5 and L5-S1  Evaluation Date: 11/4/2024  Authorization Period Expiration: 11/4/2025  Plan of Care Expiration: 12/31/2024  Progress Note Due: 11/30/2024  Date of Surgery: NAD  Visit # / Visits authorized: 1/1, 1/20  FOTO: 1/3  PTA Visit #: 0/5      Precautions: NAD   Insurance: paying out of pocket, does not have health insurance.      Time In: 3:00 pm  Time Out: 4:00 pm  Total Billable Time: 60 minutes (1MT, 3TE)     PTA Visit #: 0/5      Subjective      Patient reports: Scheduled for spine surgery in December. Was recently in the ER with worsening symptoms. Discharged with medication and scheduled with the neurosurgeon. Some slight improvement in symptoms. He was compliant with home exercise program.  Response to previous treatment: leg soreness, Spasms in RT calf > LT Calf.  Functional change: none at this time     Pain: 4/10 this session  Location: hip and BLE     Objective       Range of Motion (ROM):  Lumbar flexion: Limited with discomfort  Lumbar extension: Limited with significant pain  Lateral bending: Mild to moderate limitation bilaterally     Muscle Group Movement Strength RT  (0-5) Strength LT (0-5) Nerve Root Observations   Hip Flexors Hip Flexion   3+/5 L2-L3 Mild weakness   Quadriceps Knee Extension   3+/5 L3-L4 Full strength   Hamstrings Knee Flexion   3+/5 L5-S1 Normal   Tibialis Anterior Ankle Dorsiflexion   3+/5 L4-L5 Slight weakness   Gastrocnemius/Soleus Plantar Flexion   5/5 S1-S2 Normal    Gluteus Earl Hip Extension   3+/5 S1 Mild to moderate weakness   Gluteus Medius/Minimus Hip Abduction   4/5 L4-S1 Mild weakness   Peroneals Foot Eversion   4+/5 L5-S1 Mild weakness   EHL (Extensor Hallucis Longus) Big Toe Extension   3-/5 L5 Moderate weakness   Adductors Hip Adduction   3+/5 L2-L4 Moderate Weakness      Tests & Measures:  SLR (Straight Leg Raise): Positive for radicular symptoms at 60°  Slump Test: Positive for increased symptoms in lumbar region and right lower extremity  Facet Joint Loading: Positive bilaterally, especially at L3-L4      Treatment      Pankaj received the treatments listed below:       Manual Therapy (15 minutes):  Joint Mobilizations: Prone PAs to T12-L5, grade I-II.  Lumbar rocking, grade II-III.  Techniques: Manual traction, myofascial release, manual lymphatic drainage, soft tissue mobilization, and friction massage focused on the lumbar spine.  Therapeutic Exercises (30 minutes):  Added Nu-Step - 4 15' Sessions with seated mobilization 4x10 reps  Postural Core activation 3x15  Stance Stabilization 2x10 Reps  Gait sequencing with walker 50'  HEP Instruction    Prone lying with double pillow under hips for spinal decompression: 3 minutes.  Prone press-ups on elbows with double pillow under hips: 4 sets of 5 reps, 5-second holds with 10-second rests.  Prone passive rectus femoris stretch: Bilaterally, PT-assisted, 4 reps of 10 seconds.  Neuromuscular Re-Education (15 minutes):  Abdominal core recruitment  Therapeutic Activity (00 minutes):  None performed this session.     Patient Education and Home Exercises        Patient Education:  Explained importance of neutral spine posture during daily activities and provided lumbar support techniques.  Advised on proper body mechanics, emphasizing avoidance of forward bending and heavy lifting.  Instructed on self-management for acute pain episodes, including cold/heat application and activity modification.  Pankaj  demonstrated good comprehension and was able to execute the exercises with minimal cueing. Home exercise program instructions were reviewed and confirmed.     Written Home Exercises Provided: Yes. Exercises were reviewed and Pankaj was able to demonstrate them prior to the end of the session.  Pankaj demonstrated good  understanding of the education provided. See Electronic Medical Record under Patient Instructions for exercises provided during therapy sessions     Assessment      Post session: Soreness has increased since the ER visit. Able to walk with support of his walker a little better but still feels his legs are going to give out.  Pain is moderately reduced, though exacerbated by daily activities, indicating ongoing limitations in functional endurance and core stability.  Limited ROM and muscle stiffness observed in the lumbar spine, necessitating further strengthening and flexibility work.     Pankaj is a 29 y.o. male referred to outpatient Physical Therapy with a medical diagnosis of Degenerative Lumbar Disc Disease with Severe Spinal Canal Stenosis at L3-L4, Broad-based disc bulge at L4-L5 and L5-S1. Patient exhibits severe spinal canal stenosis at L3-L4 with symptoms consistent with lumbar radiculopathy, including positive straight leg raise and slump tests. Degenerative changes and facet arthropathy at L4-L5 and L5-S1 contribute to further lumbar mobility restrictions and pain, impacting functional activities. Neurological findings indicate mild motor weakness and sensory changes without severe neurological compromise, suggesting conservative management may be beneficial.     Reassessment to start session to assess response from last session. Pt reports slight changes from last session, continues to have left L4 motor weakness, Bilateral general foot left>right numbness/tingling, negative clonus, hyporeflexia in BLE, + slump testing on left>right, and limited tolerance for all activities.  Education on obtaining new RW that suits his height and weight, and to avoid heavy flexion based activities especially in the morning. Added on home exercise program for prone laying and gentle press ups every 3 hours if possible only to his tolerance. Requires AD still for mobility and safety. Has neurosurgeon followup on 11/13 and 11/15. Pt is paying out of pocket, does not have health insurance.      Sikhism is progressing towards his goals.     Patient prognosis is Good.      Patient will continue to benefit from skilled outpatient physical therapy to address the deficits listed in the problem list box on initial evaluation, provide pt/family education and to maximize pt's level of independence in the home and community environment.      Patient's spiritual, cultural and educational needs considered and pt agreeable to plan of care and goals.     Anticipated barriers to physical therapy: obesity     Goals:  Short Term Goals: 3 weeks   Reduce pain by 25% during basic activities such as sitting, standing, and walking - In Progress  Improve lumbar flexibility by 5-10° in flexion and extension - In Progress  Achieve independent performance of a daily stretching and strengthening home exercise program - In Progress  Reduce radicular symptoms during SLR by at least 10° - In Progress     Long Term Goals: 8 weeks   Reduce overall pain by 50% during functional activities (e.g., prolonged sitting, walking) - In Progress  Increase lumbar flexion and extension ROM by at least 20% - In Progress  Improve core stability, allowing the patient to perform 20-second holds during stabilization exercises - In Progress  Improve endurance for daily activities, enabling 20 minutes of continuous walking without significant pain - In Progress  Return to moderate physical activity with appropriate lumbar protection techniques - In Progress     Plan      Plan of Care (3 Months):  Therapeutic Exercise: Continue strengthening exercises  focused on core stabilization, lumbar flexibility, and posture control. Progress to higher resistance as tolerated.  Manual Therapy: Maintain joint mobilizations and manual techniques to improve soft tissue mobility and relieve spinal discomfort.  Neuromuscular Re-Education: Gradually introduce balance and proprioceptive exercises to reinforce posture and body alignment.  Therapeutic Activities: Incorporate task-specific functional activities as pain and mobility improve.  Patient Education and Home Exercise Program: Reinforce techniques for posture, core stability, and pain management strategies.  Follow-up sessions will assess progress toward short-term and long-term goals, with adjustments as needed.    Yanna Morales, PT   11/22/202

## 2024-11-22 NOTE — TELEPHONE ENCOUNTER
----- Message from Nurse Medrano sent at 11/21/2024  2:56 PM CST -----  Surgery 12/11  Please schedule NP, labs and ua.  Thanks!

## 2024-11-25 ENCOUNTER — CLINICAL SUPPORT (OUTPATIENT)
Dept: REHABILITATION | Facility: HOSPITAL | Age: 29
End: 2024-11-25

## 2024-11-25 ENCOUNTER — PATIENT MESSAGE (OUTPATIENT)
Dept: PAIN MEDICINE | Facility: OTHER | Age: 29
End: 2024-11-25

## 2024-11-25 ENCOUNTER — TELEPHONE (OUTPATIENT)
Dept: PREADMISSION TESTING | Facility: HOSPITAL | Age: 29
End: 2024-11-25

## 2024-11-25 DIAGNOSIS — R26.89 DECREASED MOBILITY: ICD-10-CM

## 2024-11-25 DIAGNOSIS — M51.26 LUMBAR DISC HERNIATION: Primary | ICD-10-CM

## 2024-11-25 DIAGNOSIS — R53.1 DECREASED STRENGTH: ICD-10-CM

## 2024-11-25 PROCEDURE — 97530 THERAPEUTIC ACTIVITIES: CPT | Mod: PO,CQ

## 2024-11-25 PROCEDURE — 97110 THERAPEUTIC EXERCISES: CPT | Mod: PO,CQ

## 2024-11-25 NOTE — PROGRESS NOTES
[facility]   Physical Therapy Treatment Note      [id number]    Therapy Diagnosis:   Encounter Diagnoses   Name Primary?    Lumbar disc herniation Yes    Decreased strength     Decreased mobility      Physician: Lita Nina MD    Visit Date: 11/25/2024    Physician Orders: PT Eval and Treat  Medical Diagnosis from Referral: Degenerative Lumbar Disc Disease with Severe Spinal Canal Stenosis at L3-L4, Broad-based disc bulge at L4-L5 and L5-S1  Evaluation Date: 11/4/2024  Authorization Period Expiration: 11/4/2025  Plan of Care Expiration: 12/31/2024  Progress Note Due: 11/30/2024  Date of Surgery: NAD  Visit # / Visits authorized: 1/1, 1/20  FOTO: 1/3  PTA Visit #: 1/5      Precautions: NAD   Insurance: paying out of pocket, does not have health insurance.      Time In: 3:00 pm  Time Out: 4:00 pm  Total Billable Time: 60 minutes (1MT, 2TE)     PTA Visit #: 1/5     Subjective      Patient reports: states that he is getting better as he was able to walk around a convenience store over the weekend w/o much issue.   Response to previous treatment: leg soreness, Spasms in RT calf > LT Calf.  Functional change: able to ambulate in community easier and with less pain     Pain: 4/10 this session  Location: hip and BLE     Objective       Range of Motion (ROM):  Lumbar flexion: Limited with discomfort  Lumbar extension: Limited with significant pain  Lateral bending: Mild to moderate limitation bilaterally     Muscle Group Movement Strength RT  (0-5) Strength LT (0-5) Nerve Root Observations   Hip Flexors Hip Flexion   3+/5 L2-L3 Mild weakness   Quadriceps Knee Extension   3+/5 L3-L4 Full strength   Hamstrings Knee Flexion   3+/5 L5-S1 Normal   Tibialis Anterior Ankle Dorsiflexion   3+/5 L4-L5 Slight weakness   Gastrocnemius/Soleus Plantar Flexion   5/5 S1-S2 Normal   Gluteus Earl Hip Extension   3+/5 S1 Mild to moderate weakness   Gluteus  Medius/Minimus Hip Abduction   4/5 L4-S1 Mild weakness   Peroneals Foot Eversion   4+/5 L5-S1 Mild weakness   EHL (Extensor Hallucis Longus) Big Toe Extension   3-/5 L5 Moderate weakness   Adductors Hip Adduction   3+/5 L2-L4 Moderate Weakness      Tests & Measures:  SLR (Straight Leg Raise): Positive for radicular symptoms at 60°  Slump Test: Positive for increased symptoms in lumbar region and right lower extremity  Facet Joint Loading: Positive bilaterally, especially at L3-L4      Treatment      Pankaj received the treatments listed below:       Manual Therapy (15 minutes):  Joint Mobilizations: Prone PAs to T12-L5, grade I-II.  Lumbar rocking, grade II-III.  Techniques: Manual traction, myofascial release, manual lymphatic drainage, soft tissue mobilization, and friction massage focused on the lumbar spine.  Therapeutic Exercises (30 minutes):  Added Nu-Step - 4 15' Sessions with seated mobilization 4x10 reps  Postural Core activation 3x15  Stance Stabilization 2x10 Reps  Gait sequencing with walker 50'  HEP Instruction  Prone lying with double pillow under hips for spinal decompression: 3 minutes.  Prone press-ups on elbows with double pillow under hips: 4 sets of 5 reps, 5-second holds with 10-second rests.  Prone passive rectus femoris stretch: Bilaterally, PT-assisted, 4 reps of 10 seconds.  Neuromuscular Re-Education (15 minutes):  Abdominal core recruitment  Therapeutic Activity (00 minutes):  None performed this session.     Patient Education and Home Exercises        Patient Education:  Explained importance of neutral spine posture during daily activities and provided lumbar support techniques.  Advised on proper body mechanics, emphasizing avoidance of forward bending and heavy lifting.  Instructed on self-management for acute pain episodes, including cold/heat application and activity modification.  Pankaj demonstrated good comprehension and was able to execute the exercises with minimal cueing.  Home exercise program instructions were reviewed and confirmed.     Written Home Exercises Provided: Yes. Exercises were reviewed and Pankaj was able to demonstrate them prior to the end of the session.  Pankaj demonstrated good  understanding of the education provided. See Electronic Medical Record under Patient Instructions for exercises provided during therapy sessions     Assessment      Pt tolerated therapy session well w/ goals to improve core strength, BLE strength, and functional mobility. Pt demonstrates increased ability to ambulate through clinic w/ RW and is able to feel steady on his feet quicker after standing. Attempted seated core marches, however pt unable to complete exercise due to increase of compensation w/ low back. Pt is getting a shot in his back on Wednesday. Exercises challenged appropriately. Will continue to progress as tolerated.     Pankaj is progressing towards his goals.     Patient prognosis is Good.      Patient will continue to benefit from skilled outpatient physical therapy to address the deficits listed in the problem list box on initial evaluation, provide pt/family education and to maximize pt's level of independence in the home and community environment.      Patient's spiritual, cultural and educational needs considered and pt agreeable to plan of care and goals.     Anticipated barriers to physical therapy: obesity     Goals:  Short Term Goals: 3 weeks   Reduce pain by 25% during basic activities such as sitting, standing, and walking - In Progress  Improve lumbar flexibility by 5-10° in flexion and extension - In Progress  Achieve independent performance of a daily stretching and strengthening home exercise program - In Progress  Reduce radicular symptoms during SLR by at least 10° - In Progress     Long Term Goals: 8 weeks   Reduce overall pain by 50% during functional activities (e.g., prolonged sitting, walking) - In Progress  Increase lumbar flexion and  extension ROM by at least 20% - In Progress  Improve core stability, allowing the patient to perform 20-second holds during stabilization exercises - In Progress  Improve endurance for daily activities, enabling 20 minutes of continuous walking without significant pain - In Progress  Return to moderate physical activity with appropriate lumbar protection techniques - In Progress     Plan      Plan of Care (3 Months):  Therapeutic Exercise: Continue strengthening exercises focused on core stabilization, lumbar flexibility, and posture control. Progress to higher resistance as tolerated.  Manual Therapy: Maintain joint mobilizations and manual techniques to improve soft tissue mobility and relieve spinal discomfort.  Neuromuscular Re-Education: Gradually introduce balance and proprioceptive exercises to reinforce posture and body alignment.  Therapeutic Activities: Incorporate task-specific functional activities as pain and mobility improve.  Patient Education and Home Exercise Program: Reinforce techniques for posture, core stability, and pain management strategies.  Follow-up sessions will assess progress toward short-term and long-term goals, with adjustments as needed.    Catrachito Yeung, PTA   11/25/202

## 2024-11-27 ENCOUNTER — HOSPITAL ENCOUNTER (OUTPATIENT)
Facility: OTHER | Age: 29
Discharge: HOME | End: 2024-11-27
Attending: ANESTHESIOLOGY | Admitting: ANESTHESIOLOGY

## 2024-11-27 VITALS
OXYGEN SATURATION: 97 % | SYSTOLIC BLOOD PRESSURE: 129 MMHG | BODY MASS INDEX: 38.36 KG/M2 | HEART RATE: 78 BPM | HEIGHT: 76 IN | TEMPERATURE: 98 F | WEIGHT: 315 LBS | DIASTOLIC BLOOD PRESSURE: 73 MMHG | RESPIRATION RATE: 18 BRPM

## 2024-11-27 DIAGNOSIS — G89.29 CHRONIC PAIN: ICD-10-CM

## 2024-11-27 DIAGNOSIS — M54.16 LUMBAR RADICULOPATHY: Primary | ICD-10-CM

## 2024-11-27 PROCEDURE — 63600175 PHARM REV CODE 636 W HCPCS: Performed by: ANESTHESIOLOGY

## 2024-11-27 PROCEDURE — 25500020 PHARM REV CODE 255: Performed by: ANESTHESIOLOGY

## 2024-11-27 PROCEDURE — 62323 NJX INTERLAMINAR LMBR/SAC: CPT | Mod: ,,, | Performed by: ANESTHESIOLOGY

## 2024-11-27 PROCEDURE — 99153 MOD SED SAME PHYS/QHP EA: CPT | Performed by: ANESTHESIOLOGY

## 2024-11-27 PROCEDURE — 62323 NJX INTERLAMINAR LMBR/SAC: CPT | Performed by: ANESTHESIOLOGY

## 2024-11-27 PROCEDURE — 99152 MOD SED SAME PHYS/QHP 5/>YRS: CPT | Performed by: ANESTHESIOLOGY

## 2024-11-27 RX ORDER — FENTANYL CITRATE 50 UG/ML
INJECTION, SOLUTION INTRAMUSCULAR; INTRAVENOUS
Status: DISCONTINUED | OUTPATIENT
Start: 2024-11-27 | End: 2024-11-27 | Stop reason: HOSPADM

## 2024-11-27 RX ORDER — SODIUM CHLORIDE 9 MG/ML
INJECTION, SOLUTION INTRAVENOUS CONTINUOUS
Status: DISCONTINUED | OUTPATIENT
Start: 2024-11-27 | End: 2024-11-27 | Stop reason: HOSPADM

## 2024-11-27 RX ORDER — LIDOCAINE HYDROCHLORIDE 10 MG/ML
INJECTION, SOLUTION EPIDURAL; INFILTRATION; INTRACAUDAL; PERINEURAL
Status: DISCONTINUED | OUTPATIENT
Start: 2024-11-27 | End: 2024-11-27 | Stop reason: HOSPADM

## 2024-11-27 RX ORDER — LIDOCAINE HYDROCHLORIDE 20 MG/ML
INJECTION, SOLUTION INFILTRATION; PERINEURAL
Status: DISCONTINUED | OUTPATIENT
Start: 2024-11-27 | End: 2024-11-27 | Stop reason: HOSPADM

## 2024-11-27 RX ORDER — DEXAMETHASONE SODIUM PHOSPHATE 10 MG/ML
INJECTION INTRAMUSCULAR; INTRAVENOUS
Status: DISCONTINUED | OUTPATIENT
Start: 2024-11-27 | End: 2024-11-27 | Stop reason: HOSPADM

## 2024-11-27 RX ORDER — MIDAZOLAM HYDROCHLORIDE 1 MG/ML
INJECTION INTRAMUSCULAR; INTRAVENOUS
Status: DISCONTINUED | OUTPATIENT
Start: 2024-11-27 | End: 2024-11-27 | Stop reason: HOSPADM

## 2024-11-27 NOTE — DISCHARGE SUMMARY
Discharge Note  Short Stay      SUMMARY     Admit Date: 11/27/2024    Attending Physician: Sami Reyes MD    Discharge Physician: Sami Reyes MD      Discharge Date: 11/27/2024 1:17 PM    Procedure(s) (LRB):  LUMBAR L3/4 MAGNUS DIRECT REFERRAL (N/A)    Final Diagnosis: Spinal stenosis of lumbar region, unspecified whether neurogenic claudication present [M48.061]    Disposition: Home or self care    Patient Instructions:   Current Discharge Medication List        CONTINUE these medications which have NOT CHANGED    Details   gabapentin (NEURONTIN) 400 MG capsule Take 1 capsule (400 mg total) by mouth 3 (three) times daily.  Qty: 90 capsule, Refills: 11      methocarbamoL (ROBAXIN) 500 MG Tab Take 500 mg by mouth 3 (three) times daily as needed.      methylPREDNISolone (MEDROL DOSEPACK) 4 mg tablet use as directed  Qty: 21 each, Refills: 0      naproxen (NAPROSYN) 500 MG tablet Take 1 tablet (500 mg total) by mouth 2 (two) times daily as needed (pain). Take with meals.  Qty: 30 tablet, Refills: 0                 Discharge Diagnosis: Spinal stenosis of lumbar region, unspecified whether neurogenic claudication present [M48.061]  Condition on Discharge: Stable with no complications to procedure   Diet on Discharge: Same as before.  Activity: as per instruction sheet.  Discharge to: Home with a responsible adult.  Follow up: 2-4 weeks       Please call my office or pager at [phone] if experienced any weakness or loss of sensation, fever > 101.5, pain uncontrolled with oral medications, persistent nausea/vomiting/or diarrhea, redness or drainage from the incisions, or any other worrisome concerns. If physician on call was not reached or could not communicate with our office for any reason please go to the nearest emergency department     Narendra Pozo M.D.  PGY-5  Interventional Pain Management Fellow  [facility]  Pager: [phone]

## 2024-11-27 NOTE — H&P
"HPI  Patient presenting for Procedure(s) (LRB):  LUMBAR L3/4 MAGNUS DIRECT REFERRAL (N/A)     Patient on Anti-coagulation No    No health changes since previous encounter    History reviewed. No pertinent past medical history.  History reviewed. No pertinent surgical history.  Review of patient's allergies indicates:  No Known Allergies   Current Facility-Administered Medications   Medication    0.9% NaCl infusion       PMHx, PSHx, Allergies, Medications reviewed in epic    ROS negative except pain complaints in HPI    OBJECTIVE:    /65   Pulse 80   Temp 98.1 °F (36.7 °C) (Oral)   Resp 16   Ht 6' 4" (1.93 m)   Wt (!) 181.4 kg (400 lb)   SpO2 (!) 93%   BMI 48.69 kg/m²     PHYSICAL EXAMINATION:    GENERAL: Well appearing, in no acute distress, alert and oriented x3.  PSYCH:  Mood and affect appropriate.  SKIN: Skin color, texture, turgor normal, no rashes or lesions which will impact the procedure.  CV: RRR with palpation of the radial artery.  PULM: No evidence of respiratory difficulty, symmetric chest rise. Clear to auscultation.  NEURO: Cranial nerves grossly intact.    Plan:    Proceed with procedure as planned Procedure(s) (LRB):  LUMBAR L3/4 MAGNUS DIRECT REFERRAL (N/A)    Narendra Pozo  11/27/2024            "

## 2024-11-27 NOTE — DISCHARGE INSTRUCTIONS

## 2024-11-27 NOTE — OP NOTE
Lumbar Interlaminar Epidural Steroid Injection under Fluoroscopic Guidance    The procedure, risks, benefits, and options were discussed with the patient. There are no contraindications to the procedure. The patent expressed understanding and agreed to the procedure. Informed written consent was obtained prior to the start of the procedure and can be found in the patient's chart.    PATIENT NAME: Pankaj Bustamante   MRN: [id number]     DATE OF PROCEDURE: 11/27/2024    PROCEDURE: Lumbar Interlaminar Epidural Steroid Injection L4/L5 under Fluoroscopic Guidance    PRE-OP DIAGNOSIS: Spinal stenosis of lumbar region, unspecified whether neurogenic claudication present [M48.061] Lumbar radiculopathy [M54.16]    POST-OP DIAGNOSIS: Same    PHYSICIAN: Sami Reyes MD    ASSISTANTS: Narendra Pozo MD  [facility]       MEDICATIONS INJECTED: Preservative-free Decadron 10mg with 4cc of Lidocaine 1% MPF and preservative free normal saline    LOCAL ANESTHETIC INJECTED: Xylocaine 2%     SEDATION: Versed 2mg and Fentanyl 100mcg                                                                                                                                                                                     Conscious sedation ordered by M.D. Patient re-evaluation prior to administration of conscious sedation. No changes noted in patient's status from initial evaluation. The patient's vital signs were monitored by RN and patient remained hemodynamically stable throughout the procedure.    Event Time In   Sedation Start 1325   Sedation End 1348       ESTIMATED BLOOD LOSS: None    COMPLICATIONS: None    TECHNIQUE: Time-out was performed to identify the patient and procedure to be performed. With the patient laying in a prone position, the surgical area was prepped and draped in the usual sterile fashion using ChloraPrep and a fenestrated drape. The level was determined under fluoroscopy guidance. Skin anesthesia was  achieved by injecting Lidocaine 2% over the injection site. The interlaminar space was then approached with a 20 gauge,  5 inch Tuohy needle that was introduced under fluoroscopic guidance in the AP, lateral and/or contralateral oblique imaging. Once the Ligamentum flavum was encountered loss of resistance to saline was used to enter the epidural space. With positive loss of resistance and negative aspiration for CSF or Blood, contrast dye Omnipaque (300mg/mL) was injected to confirm placement and there was no vascular runoff. 5 mL of the medication mixture listed above was injected slowly. Displacement of the radio opaque contrast after injection of the medication confirmed that the medication went into the area of the epidural space. Initial attempt at L3/4, unable to advance into epidural space. Procedure repeated at L4/5 and completed successfully. The needles were removed and bleeding was nil. A sterile dressing was applied. No specimens collected. The patient tolerated the procedure well.       The patient was monitored after the procedure in the recovery area. They were given post-procedure and discharge instructions to follow at home. The patient was discharged in a stable condition.    Narendra Pozo MD    I reviewed and edited the fellow's note. I conducted my own interview and physical examination. I agree with the findings. I was present and supervising all critical portions of the procedure.  Sami Reyes MD

## 2024-12-05 ENCOUNTER — TELEPHONE (OUTPATIENT)
Dept: NEUROSURGERY | Facility: CLINIC | Age: 29
End: 2024-12-05

## 2024-12-05 NOTE — TELEPHONE ENCOUNTER
----- Message from Nurse Medrano sent at 12/5/2024 12:38 PM CST -----  Patients appts for medical optimization,  labs and ua were cancelled for today.   Surgery scheduled  for 12/11.  Is her surgery being postponed? Do you know why they were cancelled?  Please advise  Thanks!